# Patient Record
Sex: MALE | Race: WHITE | NOT HISPANIC OR LATINO | Employment: OTHER | URBAN - NONMETROPOLITAN AREA
[De-identification: names, ages, dates, MRNs, and addresses within clinical notes are randomized per-mention and may not be internally consistent; named-entity substitution may affect disease eponyms.]

---

## 2019-04-29 ENCOUNTER — TELEPHONE (OUTPATIENT)
Dept: FAMILY MEDICINE | Facility: OTHER | Age: 49
End: 2019-04-29

## 2019-04-29 ENCOUNTER — OFFICE VISIT (OUTPATIENT)
Dept: FAMILY MEDICINE | Facility: OTHER | Age: 49
End: 2019-04-29
Attending: NURSE PRACTITIONER

## 2019-04-29 VITALS
BODY MASS INDEX: 22.98 KG/M2 | TEMPERATURE: 97.7 F | HEART RATE: 76 BPM | RESPIRATION RATE: 15 BRPM | DIASTOLIC BLOOD PRESSURE: 84 MMHG | WEIGHT: 164.13 LBS | HEIGHT: 71 IN | SYSTOLIC BLOOD PRESSURE: 128 MMHG

## 2019-04-29 DIAGNOSIS — F19.90 IV DRUG USER: ICD-10-CM

## 2019-04-29 DIAGNOSIS — Z11.3 SCREEN FOR STD (SEXUALLY TRANSMITTED DISEASE): ICD-10-CM

## 2019-04-29 DIAGNOSIS — E55.9 VITAMIN D DEFICIENCY: ICD-10-CM

## 2019-04-29 DIAGNOSIS — Z48.02 VISIT FOR SUTURE REMOVAL: ICD-10-CM

## 2019-04-29 DIAGNOSIS — F15.10 METHAMPHETAMINE ABUSE (H): ICD-10-CM

## 2019-04-29 DIAGNOSIS — Z82.49 FH: HEART DISEASE: ICD-10-CM

## 2019-04-29 DIAGNOSIS — Z72.0 TOBACCO ABUSE: ICD-10-CM

## 2019-04-29 DIAGNOSIS — F41.9 ANXIETY: ICD-10-CM

## 2019-04-29 DIAGNOSIS — Z00.00 ROUTINE HISTORY AND PHYSICAL EXAMINATION OF ADULT: Primary | ICD-10-CM

## 2019-04-29 LAB
ALBUMIN SERPL-MCNC: 4.9 G/DL (ref 3.5–5.7)
ALP SERPL-CCNC: 56 U/L (ref 34–104)
ALT SERPL W P-5'-P-CCNC: 13 U/L (ref 7–52)
ANION GAP SERPL CALCULATED.3IONS-SCNC: 8 MMOL/L (ref 3–14)
AST SERPL W P-5'-P-CCNC: 12 U/L (ref 13–39)
BILIRUB SERPL-MCNC: 0.3 MG/DL (ref 0.3–1)
BUN SERPL-MCNC: 19 MG/DL (ref 7–25)
C TRACH DNA SPEC QL PROBE+SIG AMP: NOT DETECTED
CALCIUM SERPL-MCNC: 9.9 MG/DL (ref 8.6–10.3)
CHLORIDE SERPL-SCNC: 101 MMOL/L (ref 98–107)
CHOLEST SERPL-MCNC: 169 MG/DL
CO2 SERPL-SCNC: 31 MMOL/L (ref 21–31)
CREAT SERPL-MCNC: 0.86 MG/DL (ref 0.7–1.3)
DEPRECATED CALCIDIOL+CALCIFEROL SERPL-MC: 20.1 NG/ML
ERYTHROCYTE [DISTWIDTH] IN BLOOD BY AUTOMATED COUNT: 13.2 % (ref 10–15)
GFR SERPL CREATININE-BSD FRML MDRD: >90 ML/MIN/{1.73_M2}
GLUCOSE SERPL-MCNC: 89 MG/DL (ref 70–105)
HCT VFR BLD AUTO: 46.9 % (ref 40–53)
HDLC SERPL-MCNC: 42 MG/DL (ref 23–92)
HGB BLD-MCNC: 15.5 G/DL (ref 13.3–17.7)
LDLC SERPL CALC-MCNC: 93 MG/DL
MCH RBC QN AUTO: 32.1 PG (ref 26.5–33)
MCHC RBC AUTO-ENTMCNC: 33 G/DL (ref 31.5–36.5)
MCV RBC AUTO: 97 FL (ref 78–100)
N GONORRHOEA DNA SPEC QL PROBE+SIG AMP: NOT DETECTED
NONHDLC SERPL-MCNC: 127 MG/DL
PLATELET # BLD AUTO: 412 10E9/L (ref 150–450)
POTASSIUM SERPL-SCNC: 4 MMOL/L (ref 3.5–5.1)
PROT SERPL-MCNC: 7.8 G/DL (ref 6.4–8.9)
RBC # BLD AUTO: 4.83 10E12/L (ref 4.4–5.9)
SODIUM SERPL-SCNC: 140 MMOL/L (ref 134–144)
SPECIMEN SOURCE: NORMAL
TRIGL SERPL-MCNC: 170 MG/DL
TSH SERPL DL<=0.05 MIU/L-ACNC: 1.64 IU/ML (ref 0.34–5.6)
WBC # BLD AUTO: 6.4 10E9/L (ref 4–11)

## 2019-04-29 PROCEDURE — 87491 CHLMYD TRACH DNA AMP PROBE: CPT | Mod: ZL | Performed by: NURSE PRACTITIONER

## 2019-04-29 PROCEDURE — 82306 VITAMIN D 25 HYDROXY: CPT | Mod: ZL | Performed by: NURSE PRACTITIONER

## 2019-04-29 PROCEDURE — 87389 HIV-1 AG W/HIV-1&-2 AB AG IA: CPT | Mod: ZL | Performed by: NURSE PRACTITIONER

## 2019-04-29 PROCEDURE — 80053 COMPREHEN METABOLIC PANEL: CPT | Mod: ZL | Performed by: NURSE PRACTITIONER

## 2019-04-29 PROCEDURE — 85027 COMPLETE CBC AUTOMATED: CPT | Mod: ZL | Performed by: NURSE PRACTITIONER

## 2019-04-29 PROCEDURE — 86803 HEPATITIS C AB TEST: CPT | Mod: ZL | Performed by: NURSE PRACTITIONER

## 2019-04-29 PROCEDURE — 99386 PREV VISIT NEW AGE 40-64: CPT | Performed by: NURSE PRACTITIONER

## 2019-04-29 PROCEDURE — 87591 N.GONORRHOEAE DNA AMP PROB: CPT | Mod: ZL | Performed by: NURSE PRACTITIONER

## 2019-04-29 PROCEDURE — 36415 COLL VENOUS BLD VENIPUNCTURE: CPT | Mod: ZL | Performed by: NURSE PRACTITIONER

## 2019-04-29 PROCEDURE — 84443 ASSAY THYROID STIM HORMONE: CPT | Mod: ZL | Performed by: NURSE PRACTITIONER

## 2019-04-29 PROCEDURE — 80061 LIPID PANEL: CPT | Mod: ZL | Performed by: NURSE PRACTITIONER

## 2019-04-29 ASSESSMENT — MIFFLIN-ST. JEOR: SCORE: 1636.6

## 2019-04-29 ASSESSMENT — PAIN SCALES - GENERAL: PAINLEVEL: SEVERE PAIN (7)

## 2019-04-29 NOTE — TELEPHONE ENCOUNTER
Precious from [facility] has questions regarding Dayron's visit today. Please return call. Thank you!

## 2019-04-29 NOTE — NURSING NOTE
Patient presents to clinic today for a physical for [facility]. Patient states he is also needing sutures removed from left buttock. Sutures were placed 10 days ago by at doctor is Joey. Patient is currently taking cephalexin, however he would like to discuss discontinuing medication.     No LMP for male patient.  Medication Reconciliation: complete    Landy Robert LPN  4/29/2019 8:32 AM
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
## 2019-04-29 NOTE — TELEPHONE ENCOUNTER
Spoke with Precious and she states paperwork states he can stop antibiotics, however PATRICIA says to finish. Clarified with Lisa MIN and he can stop antibiotic.  Landy Robert LPN...................4/29/2019  10:58 AM

## 2019-04-29 NOTE — PATIENT INSTRUCTIONS
Suture removal: finish the rest of your antibiotics to reduce risk of skin infection. Yogurt and/or a probiotic supplement helps with antibiotic-associated stomach issues. Keep wound clean, you may use Vaseline or triple-antibiotic ointment (Neosporin) with a band-aid over the wound to aid in healing.     Consider quitting smoking cigarettes as well. [place] Quit Plan hotline can provide you with free patches.     We will contact you with the results of your lab work today.     Visit a dentist and eye doctor when you are able.     Follow-up as needed.

## 2019-04-30 LAB
HCV AB SERPL QL IA: NONREACTIVE
HIV 1+2 AB+HIV1 P24 AG SERPL QL IA: NONREACTIVE

## 2019-10-09 ENCOUNTER — OFFICE VISIT (OUTPATIENT)
Dept: FAMILY MEDICINE | Facility: OTHER | Age: 49
End: 2019-10-09
Attending: NURSE PRACTITIONER
Payer: MEDICARE

## 2019-10-09 VITALS
SYSTOLIC BLOOD PRESSURE: 138 MMHG | DIASTOLIC BLOOD PRESSURE: 80 MMHG | OXYGEN SATURATION: 98 % | HEART RATE: 63 BPM | BODY MASS INDEX: 19.94 KG/M2 | TEMPERATURE: 97.7 F | WEIGHT: 143 LBS

## 2019-10-09 DIAGNOSIS — R63.4 WEIGHT LOSS: Primary | ICD-10-CM

## 2019-10-09 DIAGNOSIS — R53.83 FATIGUE, UNSPECIFIED TYPE: ICD-10-CM

## 2019-10-09 LAB
ALBUMIN SERPL-MCNC: 4.2 G/DL (ref 3.4–5)
ALP SERPL-CCNC: 54 U/L (ref 40–150)
ALT SERPL W P-5'-P-CCNC: 16 U/L (ref 0–70)
ANION GAP SERPL CALCULATED.3IONS-SCNC: 6 MMOL/L (ref 3–14)
AST SERPL W P-5'-P-CCNC: 6 U/L (ref 0–45)
BASOPHILS # BLD AUTO: 0.1 10E9/L (ref 0–0.2)
BASOPHILS NFR BLD AUTO: 0.5 %
BILIRUB SERPL-MCNC: 0.2 MG/DL (ref 0.2–1.3)
BUN SERPL-MCNC: 13 MG/DL (ref 7–30)
CALCIUM SERPL-MCNC: 8.7 MG/DL (ref 8.5–10.1)
CHLORIDE SERPL-SCNC: 108 MMOL/L (ref 94–109)
CO2 SERPL-SCNC: 29 MMOL/L (ref 20–32)
CREAT SERPL-MCNC: 0.84 MG/DL (ref 0.66–1.25)
CRP SERPL-MCNC: <2.9 MG/L (ref 0–8)
DIFFERENTIAL METHOD BLD: ABNORMAL
EOSINOPHIL # BLD AUTO: 0.3 10E9/L (ref 0–0.7)
EOSINOPHIL NFR BLD AUTO: 2.4 %
ERYTHROCYTE [DISTWIDTH] IN BLOOD BY AUTOMATED COUNT: 13.2 % (ref 10–15)
ERYTHROCYTE [SEDIMENTATION RATE] IN BLOOD BY WESTERGREN METHOD: 5 MM/H (ref 0–15)
GFR SERPL CREATININE-BSD FRML MDRD: >90 ML/MIN/{1.73_M2}
GLUCOSE SERPL-MCNC: 98 MG/DL (ref 70–99)
HCT VFR BLD AUTO: 45.9 % (ref 40–53)
HGB BLD-MCNC: 15.5 G/DL (ref 13.3–17.7)
IMM GRANULOCYTES # BLD: 0 10E9/L (ref 0–0.4)
IMM GRANULOCYTES NFR BLD: 0.4 %
LYMPHOCYTES # BLD AUTO: 2.7 10E9/L (ref 0.8–5.3)
LYMPHOCYTES NFR BLD AUTO: 23.4 %
MCH RBC QN AUTO: 32.1 PG (ref 26.5–33)
MCHC RBC AUTO-ENTMCNC: 33.8 G/DL (ref 31.5–36.5)
MCV RBC AUTO: 95 FL (ref 78–100)
MONOCYTES # BLD AUTO: 0.5 10E9/L (ref 0–1.3)
MONOCYTES NFR BLD AUTO: 4.6 %
NEUTROPHILS # BLD AUTO: 7.8 10E9/L (ref 1.6–8.3)
NEUTROPHILS NFR BLD AUTO: 68.7 %
NRBC # BLD AUTO: 0 10*3/UL
NRBC BLD AUTO-RTO: 0 /100
PLATELET # BLD AUTO: 366 10E9/L (ref 150–450)
POTASSIUM SERPL-SCNC: 3.9 MMOL/L (ref 3.4–5.3)
PROT SERPL-MCNC: 7.6 G/DL (ref 6.8–8.8)
RBC # BLD AUTO: 4.83 10E12/L (ref 4.4–5.9)
SODIUM SERPL-SCNC: 143 MMOL/L (ref 133–144)
TSH SERPL DL<=0.005 MIU/L-ACNC: 2.66 MU/L (ref 0.4–4)
WBC # BLD AUTO: 11.4 10E9/L (ref 4–11)

## 2019-10-09 PROCEDURE — 85025 COMPLETE CBC W/AUTO DIFF WBC: CPT | Mod: ZL | Performed by: NURSE PRACTITIONER

## 2019-10-09 PROCEDURE — G0463 HOSPITAL OUTPT CLINIC VISIT: HCPCS

## 2019-10-09 PROCEDURE — 80053 COMPREHEN METABOLIC PANEL: CPT | Mod: ZL | Performed by: NURSE PRACTITIONER

## 2019-10-09 PROCEDURE — 87015 SPECIMEN INFECT AGNT CONCNTJ: CPT | Mod: ZL | Performed by: NURSE PRACTITIONER

## 2019-10-09 PROCEDURE — 85652 RBC SED RATE AUTOMATED: CPT | Mod: ZL | Performed by: NURSE PRACTITIONER

## 2019-10-09 PROCEDURE — 99203 OFFICE O/P NEW LOW 30 MIN: CPT | Performed by: NURSE PRACTITIONER

## 2019-10-09 PROCEDURE — 87207 SMEAR SPECIAL STAIN: CPT | Mod: ZL | Performed by: NURSE PRACTITIONER

## 2019-10-09 PROCEDURE — 36415 COLL VENOUS BLD VENIPUNCTURE: CPT | Mod: ZL | Performed by: NURSE PRACTITIONER

## 2019-10-09 PROCEDURE — 86618 LYME DISEASE ANTIBODY: CPT | Mod: ZL | Performed by: NURSE PRACTITIONER

## 2019-10-09 PROCEDURE — 84443 ASSAY THYROID STIM HORMONE: CPT | Mod: ZL | Performed by: NURSE PRACTITIONER

## 2019-10-09 PROCEDURE — 86140 C-REACTIVE PROTEIN: CPT | Mod: ZL | Performed by: NURSE PRACTITIONER

## 2019-10-09 RX ORDER — BUPRENORPHINE AND NALOXONE 8; 2 MG/1; MG/1
1 FILM, SOLUBLE BUCCAL; SUBLINGUAL 2 TIMES DAILY
COMMUNITY
End: 2021-12-15

## 2019-10-09 ASSESSMENT — PAIN SCALES - GENERAL: PAINLEVEL: NO PAIN (0)

## 2019-10-09 NOTE — NURSING NOTE
Dr. Colby Crocker    10/26/2017      Jack O Fabry  [address] [id number]                    Dear Mr. Fabry    Please review the enclosed information.    Thank you.   "Chief Complaint   Patient presents with     Establish Care       Initial BP (!) 150/90 (BP Location: Left arm, Patient Position: Chair, Cuff Size: Adult Regular)   Pulse 63   Temp 97.7  F (36.5  C) (Tympanic)   Wt 64.9 kg (143 lb)   SpO2 98%   BMI 19.94 kg/m   Estimated body mass index is 19.94 kg/m  as calculated from the following:    Height as of 4/29/19: 1.803 m (5' 11\").    Weight as of this encounter: 64.9 kg (143 lb).  Medication Reconciliation: complete  Alden Rodriguez LPN  "

## 2019-10-09 NOTE — PROGRESS NOTES
Subjective     Dayron Rick is a 48 year old male who presents to clinic today for the following health issues:    HPI   New Patient/Transfer of Care  Living in [place] now    Weight loss and not feeling  well      Duration: ongoing 1 month     Description (location/character/radiation): whole body, no energy,     Intensity:  severe    Accompanying signs and symptoms: no energy, 20lb weight loss, fingers tingle when arms are bent, lump on right wrist, lump on upper right quadrant with bruising around the area, nauseated when waking up, night sweats/skin feels on fire, itching, blurry vision, constipation.     History (similar episodes/previous evaluation): None    Precipitating or alleviating factors: None    Therapies tried and outcome: None, tries to stay active, but is having difficulty getting out of bed    Family history of heart disease, diabetes and stroke     Nausea/vomiting: nauseated, but not vomiting   Bowel changes: slightly constipated   Fever: wakes up feeling hot, having night sweats occasionally   Fatigue: extreme fatigue past month possible longer  Alcohol/drug use:very little alcohol use, a limited use of mariguana   Diet: fruits, vegetables, pork, hamburger, beans  Water, adds flavor  Caffeine: none  Smoke: 1/2 ppd or less           Suboxone: prescribed through [facility]   He follows with [facility] for counseling ad psychiatry and chemical dependency     History of Chronic back pain and fusion. Treated at [facility]. Has not seen a provider at [facility] since 2012.  He cannot use pain medication due to additions     There is no problem list on file for this patient.    Past Surgical History:   Procedure Laterality Date     FUSION LUMBAR ANTERIOR, FUSION LUMBAR POSTERIOR THREE+ LEVELS, COMBINED  2013     TONSILLECTOMY         Social History     Tobacco Use     Smoking status: Current Every Day Smoker     Packs/day: 0.50     Smokeless tobacco:  Never Used   Substance Use Topics     Alcohol use: Not Currently     Comment: HX      Family History   Problem Relation Age of Onset     Diabetes Mother          at 58, unsure of cause of death     Diabetes Type 2  Father      Heart Disease Father 78        quadruple bypass     Cardiovascular Father 79        stroke     Prostate Problems Father      Cancer Paternal Grandfather         stomach cancer         Current Outpatient Medications   Medication Sig Dispense Refill     buprenorphine HCl-naloxone HCl (SUBOXONE) 8-2 MG per film Place 1 Film under the tongue 2 times daily       No Known Allergies  BP Readings from Last 3 Encounters:   10/09/19 138/80   19 128/84    Wt Readings from Last 3 Encounters:   10/09/19 64.9 kg (143 lb)   19 74.4 kg (164 lb 2 oz)                      Reviewed and updated as needed this visit by Provider         Review of Systems   ROS COMP: CONSTITUTIONAL:fever low grade and sweats  INTEGUMENTARY/SKIN: NEGATIVE for worrisome rashes, moles or lesions and occasional itchy skin POSITIVE lump right abdomen  EYES: poor eye sight, due for eye exam   ENT/MOUTH: NEGATIVE for ear, mouth and throat problems  RESP:NEGATIVE for significant cough or SOB  CV: NEGATIVE for chest pain, or peripheral edema has had palpitations in the past   GI: nausea, poor appetite and weight loss  : negative for dysuria, hematuria, decreased urinary stream, erectile dysfunction  MUSCULOSKELETAL: NEGATIVE for significant arthralgias or myalgia  History of back fusion   NEURO: generalized weakness   ENDOCRINE: night sweats and weight loss  HEME/ALLERGY/IMMUNE: NEGATIVE for bleeding problems  PSYCHIATRIC: HX anxiety and HX depression      Objective    BP (!) 150/90 (BP Location: Left arm, Patient Position: Chair, Cuff Size: Adult Regular)   Pulse 63   Temp 97.7  F (36.5  C) (Tympanic)   Wt 64.9 kg (143 lb)   SpO2 98%   BMI 19.94 kg/m    Body mass index is 19.94 kg/m .  Physical Exam   GENERAL:  alert, no distress, pale and fatigued  EYES: Eyes grossly normal to inspection, PERRL and conjunctivae and sclerae normal  HENT: ear canals and TM's normal, nose and mouth without ulcers or lesions  NECK: no adenopathy, no asymmetry, masses, or scars and thyroid normal to palpation  RESP: lungs clear to auscultation - no rales, rhonchi or wheezes  CV: regular rate and rhythm, normal S1 S2, no S3 or S4, no murmur, click or rub, no peripheral edema and peripheral pulses strong  ABDOMEN: soft, nontender, no hepatosplenomegaly, no masses and bowel sounds normal  MS: no gross musculoskeletal defects noted, no edema  SKIN: no suspicious lesions or rashes  NEURO: Normal strength and tone, mentation intact and speech normal  PSYCH: mentation appears normal and affect flat  LYMPH: normal ant/post cervical, supraclavicular nodes    Diagnostic Test Results:  Labs reviewed in Epic  Results for orders placed or performed in visit on 10/09/19 (from the past 24 hour(s))   Comprehensive metabolic panel (BMP + Alb, Alk Phos, ALT, AST, Total. Bili, TP)   Result Value Ref Range    Sodium 143 133 - 144 mmol/L    Potassium 3.9 3.4 - 5.3 mmol/L    Chloride 108 94 - 109 mmol/L    Carbon Dioxide 29 20 - 32 mmol/L    Anion Gap 6 3 - 14 mmol/L    Glucose 98 70 - 99 mg/dL    Urea Nitrogen 13 7 - 30 mg/dL    Creatinine 0.84 0.66 - 1.25 mg/dL    GFR Estimate >90 >60 mL/min/[1.73_m2]    GFR Estimate If Black >90 >60 mL/min/[1.73_m2]    Calcium 8.7 8.5 - 10.1 mg/dL    Bilirubin Total PENDING 0.2 - 1.3 mg/dL    Albumin PENDING 3.4 - 5.0 g/dL    Protein Total PENDING 6.8 - 8.8 g/dL    Alkaline Phosphatase PENDING 40 - 150 U/L    ALT PENDING 0 - 70 U/L    AST PENDING 0 - 45 U/L   CBC with platelets and differential   Result Value Ref Range    WBC 11.4 (H) 4.0 - 11.0 10e9/L    RBC Count 4.83 4.4 - 5.9 10e12/L    Hemoglobin 15.5 13.3 - 17.7 g/dL    Hematocrit 45.9 40.0 - 53.0 %    MCV 95 78 - 100 fl    MCH 32.1 26.5 - 33.0 pg    MCHC 33.8 31.5 - 36.5  g/dL    RDW 13.2 10.0 - 15.0 %    Platelet Count 366 150 - 450 10e9/L    Diff Method Automated Method     % Neutrophils 68.7 %    % Lymphocytes 23.4 %    % Monocytes 4.6 %    % Eosinophils 2.4 %    % Basophils 0.5 %    % Immature Granulocytes 0.4 %    Nucleated RBCs 0 0 /100    Absolute Neutrophil 7.8 1.6 - 8.3 10e9/L    Absolute Lymphocytes 2.7 0.8 - 5.3 10e9/L    Absolute Monocytes 0.5 0.0 - 1.3 10e9/L    Absolute Eosinophils 0.3 0.0 - 0.7 10e9/L    Absolute Basophils 0.1 0.0 - 0.2 10e9/L    Abs Immature Granulocytes 0.0 0 - 0.4 10e9/L    Absolute Nucleated RBC 0.0    CRP, inflammation   Result Value Ref Range    CRP Inflammation <2.9 0.0 - 8.0 mg/L   ESR: Erythrocyte sedimentation rate   Result Value Ref Range    Sed Rate 5 0 - 15 mm/h     Additional labs pending         Assessment & Plan     1. Weight loss  20 pound unintentional weight loss over the past month or possible slightly longer. He is on suboxone for the 3-4 months and has had good result with his cravings and chronic pain. Unknown if causing his symptoms. Plan to check labs today. He is not able to stay around for results tonight. Plan to notify of results once available. Possible may need imagine of chest and abdomen. Plan for follow up in a couple weeks.he should schedule an appointment sooner if symptoms worsen or any concerns. He should go to the ER for any worsening symptoms or concerns.   - Comprehensive metabolic panel (BMP + Alb, Alk Phos, ALT, AST, Total. Bili, TP)  - TSH with free T4 reflex  - CBC with platelets and differential  - Lyme Disease Annemarie with reflex to WB Serum  - CRP, inflammation  - ESR: Erythrocyte sedimentation rate  - Parasite stain    2. Fatigue, unspecified type  As above  - Comprehensive metabolic panel (BMP + Alb, Alk Phos, ALT, AST, Total. Bili, TP)  - TSH with free T4 reflex  - CBC with platelets and differential  - Lyme Disease Annemarie with reflex to WB Serum  - CRP, inflammation  - ESR: Erythrocyte sedimentation rate  -  Parasite stain     Tobacco Cessation:   reports that he has been smoking. He has been smoking about 0.50 packs per day. He has never used smokeless tobacco.  Tobacco cessation was strongly encouraged. Every year of smoking cessation offer health benefits.               Return in about 2 weeks (around 10/23/2019).    NELLIE Cooney CNP  [facility] [place]

## 2019-10-11 LAB
B BURGDOR IGG+IGM SER QL: 0.06 (ref 0–0.89)
PARASITE SPEC INSPECT: NORMAL
SPECIMEN SOURCE: NORMAL

## 2019-10-23 NOTE — PROGRESS NOTES
Subjective     Dayron Rick is a 49 year old male who presents to clinic today for the following health issues:    HPI   Weight loss      Duration: Follow up    Description (location/character/radiation): loss of energy - no complaints of weight loss today.    Intensity:  No concerns for weight loss. His concerns are for lack of energy     Accompanying signs and symptoms: Still has no energy/fatigue    History (similar episodes/previous evaluation): Yes - Seen in clinic 10/9/19    Precipitating or alleviating factors: None    Therapies tried and outcome: None     Patient weighs 158lb, our scale in clinic is off (per patient). Patient states weight loss is not a problem - the fatigue/no energy is the problem. He believes the initial concern for weight loss was because our scale was off by 12lbs, he was weighed the day after the last appointment on a digital scale and it said 158lb.     Counseling at [facility]. Along with medication management. He is on suboxone. [facility] does not feel the suboxone is causing his fatigue.  Treatment only for chemical dependency, not for mental health     Reviewed labs from last visit.         There is no problem list on file for this patient.    Past Surgical History:   Procedure Laterality Date     FUSION LUMBAR ANTERIOR, FUSION LUMBAR POSTERIOR THREE+ LEVELS, COMBINED  2013     TONSILLECTOMY         Social History     Tobacco Use     Smoking status: Current Every Day Smoker     Packs/day: 0.50     Smokeless tobacco: Never Used   Substance Use Topics     Alcohol use: Not Currently     Comment: HX      Family History   Problem Relation Age of Onset     Diabetes Mother          at 58, unsure of cause of death     Diabetes Type 2  Father      Heart Disease Father 78        quadruple bypass     Cardiovascular Father 79        stroke     Prostate Problems Father      Cancer Paternal Grandfather         stomach cancer         Current  "Outpatient Medications   Medication Sig Dispense Refill     buprenorphine HCl-naloxone HCl (SUBOXONE) 8-2 MG per film Place 1 Film under the tongue 2 times daily       Allergies   Allergen Reactions     Adhesive Tape      Steri strips caused blisters     BP Readings from Last 3 Encounters:   10/28/19 128/82   10/09/19 138/80   04/29/19 128/84    Wt Readings from Last 3 Encounters:   10/28/19 71.7 kg (158 lb)   10/09/19 64.9 kg (143 lb)   04/29/19 74.4 kg (164 lb 2 oz)                  Reviewed and updated as needed this visit by Provider         Review of Systems   ROS COMP: CONSTITUTIONAL:NEGATIVE for fever, chills, change in weight  INTEGUMENTARY/SKIN: NEGATIVE for worrisome rashes, moles or lesions  ENT/MOUTH: NEGATIVE for ear, mouth and throat problems  RESP:cold this week smokes 1/2 PPD  CV: NEGATIVE for chest pain or peripheral edema POSSIBLE occasional palpitation   GI: constipation and nausea occasionally   : negative for dysuria, hematuria, decreased urinary stream, erectile dysfunction  MUSCULOSKELETAL: NEGATIVE for significant arthralgias or myalgia  NEURO: chronic fatigue has had for a few months   ENDOCRINE: NEGATIVE for temperature intolerance, skin/hair changes  PSYCHIATRIC: HX anxiety and HX depression      Objective    /82   Pulse 74   Temp 97.7  F (36.5  C) (Tympanic)   Resp 16   Ht 1.803 m (5' 11\")   Wt 71.7 kg (158 lb)   SpO2 98%   BMI 22.04 kg/m    Body mass index is 22.04 kg/m .  Physical Exam   GENERAL: alert and no distress  NECK: no adenopathy, no asymmetry, masses, or scars and thyroid normal to palpation  RESP: lungs clear to auscultation - no rales, rhonchi or wheezes  CV: regular rate and rhythm, normal S1 S2, no S3 or S4, no murmur, click or rub, no peripheral edema and peripheral pulses strong  ABDOMEN: soft, nontender, no hepatosplenomegaly, no masses and bowel sounds normal  SKIN: no suspicious lesions or rashes  PSYCH: mentation appears normal and affect " flat    Diagnostic Test Results:  Labs reviewed in Epic  Results for orders placed or performed in visit on 10/09/19   Comprehensive metabolic panel (BMP + Alb, Alk Phos, ALT, AST, Total. Bili, TP)   Result Value Ref Range    Sodium 143 133 - 144 mmol/L    Potassium 3.9 3.4 - 5.3 mmol/L    Chloride 108 94 - 109 mmol/L    Carbon Dioxide 29 20 - 32 mmol/L    Anion Gap 6 3 - 14 mmol/L    Glucose 98 70 - 99 mg/dL    Urea Nitrogen 13 7 - 30 mg/dL    Creatinine 0.84 0.66 - 1.25 mg/dL    GFR Estimate >90 >60 mL/min/[1.73_m2]    GFR Estimate If Black >90 >60 mL/min/[1.73_m2]    Calcium 8.7 8.5 - 10.1 mg/dL    Bilirubin Total 0.2 0.2 - 1.3 mg/dL    Albumin 4.2 3.4 - 5.0 g/dL    Protein Total 7.6 6.8 - 8.8 g/dL    Alkaline Phosphatase 54 40 - 150 U/L    ALT 16 0 - 70 U/L    AST 6 0 - 45 U/L   TSH with free T4 reflex   Result Value Ref Range    TSH 2.66 0.40 - 4.00 mU/L   CBC with platelets and differential   Result Value Ref Range    WBC 11.4 (H) 4.0 - 11.0 10e9/L    RBC Count 4.83 4.4 - 5.9 10e12/L    Hemoglobin 15.5 13.3 - 17.7 g/dL    Hematocrit 45.9 40.0 - 53.0 %    MCV 95 78 - 100 fl    MCH 32.1 26.5 - 33.0 pg    MCHC 33.8 31.5 - 36.5 g/dL    RDW 13.2 10.0 - 15.0 %    Platelet Count 366 150 - 450 10e9/L    Diff Method Automated Method     % Neutrophils 68.7 %    % Lymphocytes 23.4 %    % Monocytes 4.6 %    % Eosinophils 2.4 %    % Basophils 0.5 %    % Immature Granulocytes 0.4 %    Nucleated RBCs 0 0 /100    Absolute Neutrophil 7.8 1.6 - 8.3 10e9/L    Absolute Lymphocytes 2.7 0.8 - 5.3 10e9/L    Absolute Monocytes 0.5 0.0 - 1.3 10e9/L    Absolute Eosinophils 0.3 0.0 - 0.7 10e9/L    Absolute Basophils 0.1 0.0 - 0.2 10e9/L    Abs Immature Granulocytes 0.0 0 - 0.4 10e9/L    Absolute Nucleated RBC 0.0    Lyme Disease Annemarie with reflex to WB Serum   Result Value Ref Range    Lyme Disease Antibodies Serum 0.06 0.00 - 0.89   CRP, inflammation   Result Value Ref Range    CRP Inflammation <2.9 0.0 - 8.0 mg/L   ESR: Erythrocyte  sedimentation rate   Result Value Ref Range    Sed Rate 5 0 - 15 mm/h   Parasite stain   Result Value Ref Range    Specimen Description Blood     Parasite Stain No Babesia found     Parasite Stain       No Anaplasma phagocytophilum or Ehrlichia spp. found    Parasite Stain       Giemsa stained thin and thick smears reveal no inclusions of Anaplasma phagocytophilum or   Ehrlichia spp. and no Babesia species.      Parasite Stain       Haylee Chen M.D., Medical Director  10/11/19             Assessment & Plan     1. Depression, unspecified depression type  Salbador state he is not concerned for weight loss. He has fatigue, stress and no energy. Reviewed labs done at last visit. He is encouraged to try OTC vitamin D. Plan to start an antidepressant. Plan to start low and go slow with his treatment of suboxone.  He is to notify his provider at [facility]. Discussed medication and side effects. He should call if any concerning symptoms or stop medication if his symptoms worsen.   - sertraline (ZOLOFT) 50 MG tablet; Take 1 tablet (50 mg) by mouth daily  Dispense: 30 tablet; Refill: 1     Tobacco Cessation:   reports that he has been smoking. He has been smoking about 0.50 packs per day. He has never used smokeless tobacco.  Tobacco cessation was strongly encouraged. Every year of smoking cessation offer health benefits.           See Patient Instructions    Return in about 4 weeks (around 11/25/2019) for depression.    NELLIE [facility] - DIANE

## 2019-10-28 ENCOUNTER — OFFICE VISIT (OUTPATIENT)
Dept: FAMILY MEDICINE | Facility: OTHER | Age: 49
End: 2019-10-28
Attending: NURSE PRACTITIONER
Payer: MEDICARE

## 2019-10-28 VITALS
TEMPERATURE: 97.7 F | WEIGHT: 158 LBS | OXYGEN SATURATION: 98 % | BODY MASS INDEX: 22.12 KG/M2 | HEIGHT: 71 IN | SYSTOLIC BLOOD PRESSURE: 128 MMHG | RESPIRATION RATE: 16 BRPM | DIASTOLIC BLOOD PRESSURE: 82 MMHG | HEART RATE: 74 BPM

## 2019-10-28 DIAGNOSIS — F32.A DEPRESSION, UNSPECIFIED DEPRESSION TYPE: Primary | ICD-10-CM

## 2019-10-28 PROCEDURE — 99213 OFFICE O/P EST LOW 20 MIN: CPT | Performed by: NURSE PRACTITIONER

## 2019-10-28 PROCEDURE — G0463 HOSPITAL OUTPT CLINIC VISIT: HCPCS

## 2019-10-28 ASSESSMENT — PAIN SCALES - GENERAL: PAINLEVEL: MODERATE PAIN (5)

## 2019-10-28 ASSESSMENT — MIFFLIN-ST. JEOR: SCORE: 1603.81

## 2019-10-28 NOTE — PATIENT INSTRUCTIONS
Patient Education     Sertraline tablets  Brand Name: Zoloft  What is this medicine?  SERTRALINE (SER tra mesha) is used to treat depression. It may also be used to treat obsessive compulsive disorder, panic disorder, post-trauma stress, premenstrual dysphoric disorder (PMDD) or social anxiety.  How should I use this medicine?  Take this medicine by mouth with a glass of water. Follow the directions on the prescription label. You can take it with or without food. Take your medicine at regular intervals. Do not take your medicine more often than directed. Do not stop taking this medicine suddenly except upon the advice of your doctor. Stopping this medicine too quickly may cause serious side effects or your condition may worsen.  A special MedGuide will be given to you by the pharmacist with each prescription and refill. Be sure to read this information carefully each time.  Talk to your pediatrician regarding the use of this medicine in children. While this drug may be prescribed for children as young as 7 years for selected conditions, precautions do apply.  What side effects may I notice from receiving this medicine?  Side effects that you should report to your doctor or health care professional as soon as possible:    allergic reactions like skin rash, itching or hives, swelling of the face, lips, or tongue    anxious    black, tarry stools    changes in vision    confusion    elevated mood, decreased need for sleep, racing thoughts, impulsive behavior    eye pain    fast, irregular heartbeat    feeling faint or lightheaded, falls    feeling agitated, angry, or irritable    hallucination, loss of contact with reality    loss of balance or coordination    loss of memory    painful or prolonged erections    restlessness, pacing, inability to keep still    seizures    stiff muscles    suicidal thoughts or other mood changes    trouble sleeping    unusual bleeding or bruising    unusually weak or  tired    vomiting  Side effects that usually do not require medical attention (report to your doctor or health care professional if they continue or are bothersome):    change in appetite or weight    change in sex drive or performance    diarrhea    increased sweating    indigestion, nausea    tremors  What may interact with this medicine?  Do not take this medicine with any of the following medications:    cisapride    dofetilide    dronedarone    linezolid    MAOIs like Carbex, Eldepryl, Marplan, Nardil, and Parnate    methylene blue (injected into a vein)    pimozide    thioridazine  This medicine may also interact with the following medications:    alcohol    amphetamines    aspirin and aspirin-like medicines    certain medicines for depression, anxiety, or psychotic disturbances    certain medicines for fungal infections like ketoconazole, fluconazole, posaconazole, and itraconazole    certain medicines for irregular heart beat like flecainide, quinidine, propafenone    certain medicines for migraine headaches like almotriptan, eletriptan, frovatriptan, naratriptan, rizatriptan, sumatriptan, zolmitriptan    certain medicines for sleep    certain medicines for seizures like carbamazepine, valproic acid, phenytoin    certain medicines that treat or prevent blood clots like warfarin, enoxaparin, dalteparin    cimetidine    digoxin    diuretics    fentanyl    isoniazid    lithium    NSAIDs, medicines for pain and inflammation, like ibuprofen or naproxen    other medicines that prolong the QT interval (cause an abnormal heart rhythm)    rasagiline    safinamide    supplements like [place]'s wort, kava kava, valerian    tolbutamide    tramadol    tryptophan  What if I miss a dose?  If you miss a dose, take it as soon as you can. If it is almost time for your next dose, take only that dose. Do not take double or extra doses.  Where should I keep my medicine?  Keep out of the reach of children.  Store at room  temperature between 15 and 30 degrees C (59 and 86 degrees F). Throw away any unused medicine after the expiration date.  What should I tell my health care provider before I take this medicine?  They need to know if you have any of these conditions:    bleeding disorders    bipolar disorder or a family history of bipolar disorder    glaucoma    heart disease    high blood pressure    history of irregular heartbeat    history of low levels of calcium, magnesium, or potassium in the blood    if you often drink alcohol    liver disease    receiving electroconvulsive therapy    seizures    suicidal thoughts, plans, or attempt; a previous suicide attempt by you or a family member    take medicines that treat or prevent blood clots    thyroid disease    an unusual or allergic reaction to sertraline, other medicines, foods, dyes, or preservatives    pregnant or trying to get pregnant    breast-feeding  What should I watch for while using this medicine?  Tell your doctor if your symptoms do not get better or if they get worse. Visit your doctor or health care professional for regular checks on your progress. Because it may take several weeks to see the full effects of this medicine, it is important to continue your treatment as prescribed by your doctor.  Patients and their families should watch out for new or worsening thoughts of suicide or depression. Also watch out for sudden changes in feelings such as feeling anxious, agitated, panicky, irritable, hostile, aggressive, impulsive, severely restless, overly excited and hyperactive, or not being able to sleep. If this happens, especially at the beginning of treatment or after a change in dose, call your health care professional.  You may get drowsy or dizzy. Do not drive, use machinery, or do anything that needs mental alertness until you know how this medicine affects you. Do not stand or sit up quickly, especially if you are an older patient. This reduces the risk of  dizzy or fainting spells. Alcohol may interfere with the effect of this medicine. Avoid alcoholic drinks.  Your mouth may get dry. Chewing sugarless gum or sucking hard candy, and drinking plenty of water may help. Contact your doctor if the problem does not go away or is severe.  NOTE:This sheet is a summary. It may not cover all possible information. If you have questions about this medicine, talk to your doctor, pharmacist, or health care provider. Copyright  2019 [employer]

## 2019-10-28 NOTE — NURSING NOTE
"Chief Complaint   Patient presents with     Weight Loss     follow up       Initial /82   Pulse 74   Temp 97.7  F (36.5  C) (Tympanic)   Resp 16   Ht 1.803 m (5' 11\")   Wt 71.7 kg (158 lb)   SpO2 98%   BMI 22.04 kg/m   Estimated body mass index is 22.04 kg/m  as calculated from the following:    Height as of this encounter: 1.803 m (5' 11\").    Weight as of this encounter: 71.7 kg (158 lb).  Medication Reconciliation: complete  Fatou Delgado LPN  "

## 2020-03-11 ENCOUNTER — HEALTH MAINTENANCE LETTER (OUTPATIENT)
Age: 50
End: 2020-03-11

## 2020-05-06 NOTE — PATIENT INSTRUCTIONS
Physical Therapy Daily Treatment Note     Name: Jose C Markham  [facility] Number: [id number]    Therapy Diagnosis:   Encounter Diagnosis   Name Primary?    Decreased activities of daily living (ADL)      Physician: Rena Simon, *    Visit Date: 5/6/2020  Physician Orders: PT Eval and Treat   Medical Diagnosis from Referral: S83.005D (ICD-10-CM) - Closed dislocation of left patella, subsequent encounter        Left knee medial patellofemoral ligament reconstruction with allograft  Evaluation Date: 2/27/2020  Authorization Period Expiration: 05/29/2020  Plan of Care Expiration: 05/29/2020  Visit # / Visits authorized: 14 / 30                      Time In: 1455  Time Out: 1555  Total Billable Time: 53 Minutes    Precautions:  He can work with PT to wean out of his brace at this point                       Surgery (02/13/2020)          S/P Begin week 9     PHASE I (2-6 weeks)  GOALS:  Tendon healing   Pain and swelling control   ROM 0-90 by the end of 6 weeks.     EXERCISES/RESTRICTIONS:   Continue with brace, crutches  50% weight bearing in extension   keep locked in extension even when sleeping except for ROM exercises within limits of brace  Avoid active knee extension   Avoid aggressive flexion   Physical Therapy   ROM exercises- gradually increase  0-2 weeks 0-40 degrees  2-4 weeks 0-60 degrees   4-6 weeks=0-90 degrees   Seated passive flexion   Active assisted extension   Quadriceps isometrics   Straight leg raise with brace locked out at 0   Hip/CORE/ankle strengthening   Scar mobilization   Patella mobilizations- translate medially only  Modalities-stim OK except for MPFL repair/reconstructions (patella instability surgery)      PHASE II (6-12 weeks)  GOALS:   Improve ROM to 120  Improve quadriceps strength   Normalize gait   Wean off crutches and then out of brace     EXERCISES/RESTRICTIONS:   Brace unlocked to 60 with good quadriceps control.   Wean off crutches and then out of brace weeks 6-8 as long  Patient Education     Weakness with Uncertain Cause  Based on your exam today, the exact cause of your weakness is not certain. But your weakness does not seem to be a sign of a serious illness at this time. Keep an eye on your symptoms and get medical advice as instructed below.  Home care    Rest at home today. Don't over-exert yourself.    Take any medicine as prescribed.    For the next few days, drink extra fluids (unless your healthcare provider wants you to restrict fluids for other reasons). Don't skip meals.    Unless otherwise directed, continue to take any prescription medicines.    Contact your healthcare provider if you have any questions or concerns.  Follow-up care  Follow up with your healthcare provider, or as advised.  When to seek medical advice  Call your healthcare provider right away for any of the following:    Symptoms get worse    Symptoms don't start getting better within 2 days    Fever of 100.4  F (38  C) or higher, or as directed by your healthcare provider  Call 911  Call 911 for any of these:    Chest, arm, neck, jaw, or upper back pain    Trouble breathing    Numbness or weakness of the face, one arm, or one leg    Slurred speech, confusion, or trouble speaking, walking, or seeing    Blood in vomit or stool (black or red color)    Loss of consciousness    Severe headache  Date Last Reviewed: 10/1/2017    [id number] [employer]. [address] [place] [address], [place] [address]. All rights reserved. This information is not intended as a substitute for professional medical care. Always follow your healthcare professional's instructions.            "as good quad control, motion, gait and swelling minimal  Avoid aggressive flexion ROM   A/AAROM knee flexion exercises   Continue patellar mobilization - translate medially only  Progression to regular bike   Leg press when ROM >60 deg   Initiate forward step-up program   Wall slides   Proprioception program   Modalities OK     Subjective     Pt reports: doing well with no new s/s. Patient was out last week due to illness purposes.  He was compliant with home exercise program.  Response to previous treatment: As above  Functional change: Marked improvement in PROM, but Quad strength lags in eccentric and difficulty decending stairs    Pain: 0/10  Location: left knee      Objective     Jose C received therapeutic exercises to develop strength and ROM for 53 minutes including:    Elliptical @ L1 x 8 minutes and R3  TM: backward walk manually x 3 minutes    Seated: left knee extension with strap, emphasis on TKE 5 x 30"  Standing gastroc stretch 3/30" on slant board  Prone hip flexor/quad stretch with strap x 2 minutes intermittently  S/L: clam shells blue band 3/10 each  Quad set/SLR  2# 2/10   Prone: HSC 5# 2/10    AROM: left knee : 0- 120 degrees, no pain  Patella mob: medial only    Standing: Matrix Hip  Abduction 40# 3/10  Extension 40# 3/10    Forward step ups: 6" 2/10  Lateral step downs: (L) 1/15    Shuttle: alternating  DL 4 bands  3/10  Single (L) 3b # 3/10    Eccentric 0 to 40 degrees: 1/15 (AA) (previous)    Wall : sustain squats with green physio-ball 1 x 5, 30"    Re-bounder: red ball  2 leg to single leg ball throws x 30    Box: 26" to 18" squats with 10# x 20 each    BWD lunges 2/10 each    Home Exercises Provided and Patient Education Provided  Education provided:   - Quad strengthening and knee control    Written Home Exercises Provided: Patient instructed to cont prior HEP.  Exercises were reviewed and Jose C was able to demonstrate them prior to the end of the session.  Jose C demonstrated good  " understanding of the education provided.     See EMR under Media for exercises provided prior visit.    Assessment   Good tolerance with TE progression. No new s/s.    Jose C is progressing well towards his goals.   Pt prognosis is Excellent.     Pt will continue to benefit from skilled outpatient physical therapy to address the deficits listed in the problem list box on initial evaluation, provide pt/family education and to maximize pt's level of independence in the home and community environment.     Pt's spiritual, cultural and educational needs considered and pt agreeable to plan of care and goals.    Anticipated barriers to physical therapy: None    Goals: Short Term Goals: 4 weeks   90 degrees FLEXION (MET)  Improve Quad strength (PROGRESS)  Progress to next phase of rehab (MET)  Long Term Goals: 10 weeks   Progress to closed chain exercises (MET)  Improve eccentric knee control   Return to all ADL without difficulty  0-120 degrees PROM (MET)    Plan   PHASE III (12-20 weeks)  GOALS:  Full knee ROM   Improve quadriceps flexibility   Return to normal ADL   Independent in home therapy   EXERCISES/RESTRICTIONS:   Knee flexion ROM   Quad/Hamstring strengthening   -step up/step down   -progress squat program   Advanced Proprioception   Agility training   Elliptical OK, Bike OK   Modalities   Home exercise program     Continue POC with single leg loading as well for neuromuscular control purposes.    Raza Lau, PT

## 2021-01-03 ENCOUNTER — HEALTH MAINTENANCE LETTER (OUTPATIENT)
Age: 51
End: 2021-01-03

## 2021-10-10 ENCOUNTER — HEALTH MAINTENANCE LETTER (OUTPATIENT)
Age: 51
End: 2021-10-10

## 2021-12-15 ENCOUNTER — OFFICE VISIT (OUTPATIENT)
Dept: FAMILY MEDICINE | Facility: CLINIC | Age: 51
End: 2021-12-15
Payer: MEDICARE

## 2021-12-15 VITALS
WEIGHT: 170.8 LBS | HEIGHT: 70 IN | HEART RATE: 64 BPM | RESPIRATION RATE: 12 BRPM | BODY MASS INDEX: 24.45 KG/M2 | DIASTOLIC BLOOD PRESSURE: 84 MMHG | SYSTOLIC BLOOD PRESSURE: 120 MMHG | TEMPERATURE: 98.3 F

## 2021-12-15 DIAGNOSIS — Z23 HIGH PRIORITY FOR 2019-NCOV VACCINE: ICD-10-CM

## 2021-12-15 DIAGNOSIS — G89.4 CHRONIC PAIN SYNDROME: ICD-10-CM

## 2021-12-15 DIAGNOSIS — Z23 NEED FOR VACCINATION: ICD-10-CM

## 2021-12-15 DIAGNOSIS — F15.11 HISTORY OF METHAMPHETAMINE ABUSE (H): ICD-10-CM

## 2021-12-15 DIAGNOSIS — Z00.00 ENCOUNTER FOR MEDICARE ANNUAL WELLNESS EXAM: Primary | ICD-10-CM

## 2021-12-15 DIAGNOSIS — Z98.1 STATUS POST LUMBAR SPINAL FUSION: ICD-10-CM

## 2021-12-15 DIAGNOSIS — Z12.11 SCREEN FOR COLON CANCER: ICD-10-CM

## 2021-12-15 DIAGNOSIS — Z12.5 PROSTATE CANCER SCREENING: ICD-10-CM

## 2021-12-15 DIAGNOSIS — Z13.220 LIPID SCREENING: ICD-10-CM

## 2021-12-15 DIAGNOSIS — Z13.1 DIABETES MELLITUS SCREENING: ICD-10-CM

## 2021-12-15 LAB
ANION GAP SERPL CALCULATED.3IONS-SCNC: 12 MMOL/L (ref 5–18)
BUN SERPL-MCNC: 10 MG/DL (ref 8–22)
CALCIUM SERPL-MCNC: 9.8 MG/DL (ref 8.5–10.5)
CHLORIDE BLD-SCNC: 104 MMOL/L (ref 98–107)
CHOLEST SERPL-MCNC: 184 MG/DL
CO2 SERPL-SCNC: 25 MMOL/L (ref 22–31)
CREAT SERPL-MCNC: 0.76 MG/DL (ref 0.7–1.3)
FASTING STATUS PATIENT QL REPORTED: YES
GFR SERPL CREATININE-BSD FRML MDRD: >90 ML/MIN/1.73M2
GLUCOSE BLD-MCNC: 112 MG/DL (ref 70–125)
HDLC SERPL-MCNC: 57 MG/DL
LDLC SERPL CALC-MCNC: 113 MG/DL
POTASSIUM BLD-SCNC: 4.2 MMOL/L (ref 3.5–5)
PSA SERPL-MCNC: 0.76 UG/L (ref 0–3.5)
SODIUM SERPL-SCNC: 141 MMOL/L (ref 136–145)
TRIGL SERPL-MCNC: 71 MG/DL

## 2021-12-15 PROCEDURE — G0439 PPPS, SUBSEQ VISIT: HCPCS | Performed by: FAMILY MEDICINE

## 2021-12-15 PROCEDURE — 99213 OFFICE O/P EST LOW 20 MIN: CPT | Mod: 25 | Performed by: FAMILY MEDICINE

## 2021-12-15 PROCEDURE — 90472 IMMUNIZATION ADMIN EACH ADD: CPT | Performed by: FAMILY MEDICINE

## 2021-12-15 PROCEDURE — 0004A COVID-19,PF,PFIZER (12+ YRS): CPT | Performed by: FAMILY MEDICINE

## 2021-12-15 PROCEDURE — 80048 BASIC METABOLIC PNL TOTAL CA: CPT | Performed by: FAMILY MEDICINE

## 2021-12-15 PROCEDURE — 90682 RIV4 VACC RECOMBINANT DNA IM: CPT | Performed by: FAMILY MEDICINE

## 2021-12-15 PROCEDURE — 90715 TDAP VACCINE 7 YRS/> IM: CPT | Performed by: FAMILY MEDICINE

## 2021-12-15 PROCEDURE — 80061 LIPID PANEL: CPT | Performed by: FAMILY MEDICINE

## 2021-12-15 PROCEDURE — 91300 COVID-19,PF,PFIZER (12+ YRS): CPT | Performed by: FAMILY MEDICINE

## 2021-12-15 PROCEDURE — G0103 PSA SCREENING: HCPCS | Performed by: FAMILY MEDICINE

## 2021-12-15 PROCEDURE — 90471 IMMUNIZATION ADMIN: CPT | Performed by: FAMILY MEDICINE

## 2021-12-15 PROCEDURE — 36415 COLL VENOUS BLD VENIPUNCTURE: CPT | Performed by: FAMILY MEDICINE

## 2021-12-15 ASSESSMENT — ANXIETY QUESTIONNAIRES
3. WORRYING TOO MUCH ABOUT DIFFERENT THINGS: SEVERAL DAYS
7. FEELING AFRAID AS IF SOMETHING AWFUL MIGHT HAPPEN: SEVERAL DAYS
IF YOU CHECKED OFF ANY PROBLEMS ON THIS QUESTIONNAIRE, HOW DIFFICULT HAVE THESE PROBLEMS MADE IT FOR YOU TO DO YOUR WORK, TAKE CARE OF THINGS AT HOME, OR GET ALONG WITH OTHER PEOPLE: SOMEWHAT DIFFICULT
1. FEELING NERVOUS, ANXIOUS, OR ON EDGE: SEVERAL DAYS
GAD7 TOTAL SCORE: 4
2. NOT BEING ABLE TO STOP OR CONTROL WORRYING: SEVERAL DAYS
6. BECOMING EASILY ANNOYED OR IRRITABLE: NOT AT ALL
4. TROUBLE RELAXING: NOT AT ALL
5. BEING SO RESTLESS THAT IT IS HARD TO SIT STILL: NOT AT ALL

## 2021-12-15 ASSESSMENT — ACTIVITIES OF DAILY LIVING (ADL): CURRENT_FUNCTION: NO ASSISTANCE NEEDED

## 2021-12-15 ASSESSMENT — MIFFLIN-ST. JEOR: SCORE: 1632.02

## 2021-12-15 ASSESSMENT — PATIENT HEALTH QUESTIONNAIRE - PHQ9: SUM OF ALL RESPONSES TO PHQ QUESTIONS 1-9: 4

## 2021-12-15 NOTE — ASSESSMENT & PLAN NOTE
Given the 6 level fusion and limitations of movement and pain is on full flexion of stability.  Patient lives he does not want to use any opioids especially given his previous methamphetamine use history.  Has found good relief with cannabis.  As such we will confirm diagnosis and the [place] cannabis program to help with more consistent and reliable treatment.

## 2021-12-15 NOTE — ASSESSMENT & PLAN NOTE
With chronic pain and limitations resulting in disability rating. Continue current use of NSAIDS and tylenol. Will also confirm to [place] medical cannibis program of the diagnosis.

## 2021-12-15 NOTE — PATIENT INSTRUCTIONS
Patient Education   Personalized Prevention Plan  You are due for the preventive services outlined below.  Your care team is available to assist you in scheduling these services.  If you have already completed any of these items, please share that information with your care team to update in your medical record.  Health Maintenance Due   Topic Date Due     ANNUAL REVIEW OF HM ORDERS  Never done     Colorectal Cancer Screening  Never done     LUNG CANCER SCREENING  Never done     Diptheria Tetanus Pertussis (DTAP/TDAP/TD) Vaccine (2 - Td or Tdap) 08/23/2021     Flu Vaccine (1) Never done     COVID-19 Vaccine (3 - Booster for Pfizer series) 11/22/2021       Exercise for a Healthier Heart  You may wonder how you can improve the health of your heart. If you re thinking about exercise, you re on the right track. You don t need to become an athlete. But you do need a certain amount of brisk exercise to help strengthen your heart. If you have been diagnosed with a heart condition, your healthcare provider may advise exercise to help stabilize your condition. To help make exercise a habit, choose safe, fun activities.      Exercise with a friend. When activity is fun, you're more likely to stick with it.   Before you start  Check with your healthcare provider before starting an exercise program. This is especially important if you have not been active for a while. It's also important if you have a long-term (chronic) health problem such as heart disease, diabetes, or obesity. Or if you are at high risk for having these problems.   Why exercise?  Exercising regularly offers many healthy rewards. It can help you do all of the following:     Improve your blood cholesterol level to help prevent further heart trouble    Lower your blood pressure to help prevent a stroke or heart attack    Control diabetes, or reduce your risk of getting this disease    Improve your heart and lung function    Reach and stay at a healthy  weight    Make your muscles stronger so you can stay active    Prevent falls and fractures by slowing the loss of bone mass (osteoporosis)    Manage stress better    Reduce your blood pressure    Improve your sense of self and your body image  Exercise tips      Ease into your routine. Set small goals. Then build on them. If you are not sure what your activity level should be, talk with your healthcare provider first before starting an exercise routine.    Exercise on most days. Aim for a total of 150 minutes (2 hours and 30 minutes) or more of moderate-intensity aerobic activity each week. Or 75 minutes (1 hour and 15 minutes) or more of vigorous-intensity aerobic activity each week. Or try for a combination of both. Moderate activity means that you breathe heavier and your heart rate increases but you can still talk. Think about doing 40 minutes of moderate exercise, 3 to 4 times a week. For best results, activity should last for about 40 minutes to lower blood pressure and cholesterol. It's OK to work up to the 40-minute period over time. Examples of moderate-intensity activity are walking 1 mile in 15 minutes. Or doing 30 to 45 minutes of yard work.    Step up your daily activity level.  Along with your exercise program, try being more active the whole day. Walk instead of drive. Or park further away so that you take more steps each day. Do more household tasks or yard work. You may not be able to meet the advised mount of physical activity. But doing some moderate- or vigorous-intensity aerobic activity can help reduce your risk for heart disease. Your healthcare provider can help you figure out what is best for you.    Choose 1 or more activities you enjoy.  Walking is one of the easiest things you can do. You can also try swimming, riding a bike, dancing, or taking an exercise class.    When to call your healthcare provider  Call your healthcare provider if you have any of these:     Chest pain or feel dizzy  or lightheaded    Burning, tightness, pressure, or heaviness in your chest, neck, shoulders, back, or arms    Abnormal shortness of breath    More joint or muscle pain    A very fast or irregular heartbeat (palpitations)  [employer] last reviewed this educational content on 7/1/2019 2000-2021 [employer]. All rights reserved. This information is not intended as a substitute for professional medical care. Always follow your healthcare professional's instructions.         Patient Education   Alcohol Use     Many people can enjoy a glass of wine or beer without any negative consequences to their health. According to the Centers for Disease Control and Prevention (CDC), having one or fewer drinks per day for women and two or fewer per day for men is considered moderate drinking.     When people drink more than moderately, it can become concerning. Excessive drinking is defined as consuming 15 drinks or more per week for men and 8 drinks or more per week for women. There are various health problems associated with excessive drinking, which include:    Damage to vital organs like the heart, brain, liver and pancreas    Harm to the digestive tract    Weaken the immune system    Higher risk for heart disease and cancer    There are many resources available to people who are addicted to alcohol. A counselor or other health care provider can give you support. So can a , , or rabbi who is trained in substance abuse counseling. Friends and family may also help once you are connected with professionals.

## 2021-12-15 NOTE — PROGRESS NOTES
"SUBJECTIVE:   Dayron Rick is a 51 year old male who presents for Preventive Visit.      Patient has been advised of split billing requirements and indicates understanding: Yes   Are you in the first 12 months of your Medicare coverage?  No    Denies any chest pain, shortness of breath, dyspnea exertion, palpitations, nausea or vomiting.  Denies any changes in vision or hearing, or urinary or bowel habits.    Significant chronic back pain.  He is now off all medications.  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  He does use marijuana 2-3 times a week.  Also went over his alcohol intake which at times does include 3-5 drinks in a day.  Discussed of reducing that down.  Also the fact that he uses alcohol-relieve the pain.  Discussed with patient that marijuana is a better option as well as other medications.  He says he does want be on pills but also pointed out to patient that he is currently taking medication that one time was actually used however we now know there are better options with less long-term effects.  As such she will cut back on drinking.    Healthy Habits:     In general, how would you rate your overall health?  Good    Frequency of exercise:  1 day/week    Duration of exercise:  15-30 minutes    Do you usually eat at least 4 servings of fruit and vegetables a day, include whole grains    & fiber and avoid regularly eating high fat or \"junk\" foods?  Yes    Taking medications regularly:  Not Applicable    Barriers to taking medications:  Not applicable    Medication side effects:  Not applicable    Ability to successfully perform activities of daily living:  No assistance needed    Home Safety:  No safety concerns identified    Hearing Impairment:  No hearing concerns    In the past 6 months, have you been bothered by leaking of urine?  No    In general, how would you rate your overall mental or emotional health?  Good      PHQ-2 Total Score: 0    Additional concerns today:  " Yes  Imm/Inj      Do you feel safe in your environment? Yes    Have you ever done Advance Care Planning? (For example, a Health Directive, POLST, or a discussion with a medical provider or your loved ones about your wishes): No, advance care planning information given to patient to review.  Advanced care planning was discussed at today's visit.       Fall risk  Fallen 2 or more times in the past year?: No  Any fall with injury in the past year?: No    Cognitive Screening   1) Repeat 3 items (Leader, Season, Table)    2) Clock draw: NORMAL  3) 3 item recall: Recalls 3 objects  Results: 3 items recalled: COGNITIVE IMPAIRMENT LESS LIKELY    Mini-CogTM Copyright S Atilio. Licensed by the author for use in [facility]; reprinted with permission (gill@[email or web address]). All rights reserved.        Reviewed and updated as needed this visit by clinical staff  Tobacco  Allergies  Meds  Problems  Med Hx  Surg Hx  Fam Hx  Soc Hx         Reviewed and updated as needed this visit by Provider  Tobacco  Allergies  Meds  Problems  Med Hx  Surg Hx  Fam Hx        Social History     Tobacco Use     Smoking status: Current Every Day Smoker     Packs/day: 0.50     Smokeless tobacco: Never Used   Substance Use Topics     Alcohol use: Yes     Alcohol/week: 12.0 standard drinks     Types: 12 Glasses of wine per week         Alcohol Use 12/15/2021   Prescreen: >3 drinks/day or >7 drinks/week? Yes   AUDIT SCORE  9       Current providers sharing in care for this patient include:   Patient Care Team:  No Ref-Primary, Physician as PCP - Gladys Em APRN CNP as Assigned PCP    The following health maintenance items are reviewed in Epic and correct as of today:  Health Maintenance Due   Topic Date Due     COLORECTAL CANCER SCREENING  Never done     LUNG CANCER SCREENING  Never done     Lab work is in process      Review of Systems   All other systems reviewed and are negative.      OBJECTIVE:   /84  "(BP Location: Left arm, Patient Position: Sitting, Cuff Size: Adult Large)   Pulse 64   Temp 98.3  F (36.8  C) (Oral)   Resp 12   Ht 1.772 m (5' 9.75\")   Wt 77.5 kg (170 lb 12.8 oz)   BMI 24.68 kg/m   Estimated body mass index is 24.68 kg/m  as calculated from the following:    Height as of this encounter: 1.772 m (5' 9.75\").    Weight as of this encounter: 77.5 kg (170 lb 12.8 oz).  Physical Exam  Vitals and nursing note reviewed.   Constitutional:       General: He is not in acute distress.     Appearance: Normal appearance.   HENT:      Head: Normocephalic and atraumatic.      Right Ear: Tympanic membrane, ear canal and external ear normal.      Left Ear: Tympanic membrane, ear canal and external ear normal.      Nose: Nose normal.      Mouth/Throat:      Mouth: Mucous membranes are moist.      Pharynx: Oropharynx is clear. No oropharyngeal exudate.   Eyes:      General: No scleral icterus.     Extraocular Movements: Extraocular movements intact.      Conjunctiva/sclera: Conjunctivae normal.   Neck:      Vascular: No carotid bruit.   Cardiovascular:      Rate and Rhythm: Normal rate and regular rhythm.      Pulses: Normal pulses.      Heart sounds: Normal heart sounds. No murmur heard.  No friction rub. No gallop.    Pulmonary:      Effort: Pulmonary effort is normal.      Breath sounds: Normal breath sounds. No wheezing, rhonchi or rales.   Musculoskeletal:         General: No swelling. Normal range of motion.      Cervical back: Normal range of motion.      Right lower leg: No edema.      Left lower leg: No edema.   Skin:     General: Skin is warm and dry.      Capillary Refill: Capillary refill takes less than 2 seconds.      Coloration: Skin is not jaundiced.      Findings: No rash.   Neurological:      General: No focal deficit present.      Mental Status: He is alert and oriented to person, place, and time.      Cranial Nerves: No cranial nerve deficit.      Gait: Gait is intact. Gait normal.      " Deep Tendon Reflexes:      Reflex Scores:       Bicep reflexes are 2+ on the right side and 2+ on the left side.       Patellar reflexes are 2+ on the right side and 2+ on the left side.  Psychiatric:         Mood and Affect: Mood normal.         Thought Content: Thought content normal.           ASSESSMENT / PLAN:     Problem List Items Addressed This Visit        Nervous and Auditory    Chronic pain syndrome     Given the 6 level fusion and limitations of movement and pain is on full flexion of stability.  Patient lives he does not want to use any opioids especially given his previous methamphetamine use history.  Has found good relief with cannabis.  As such we will confirm diagnosis and the [place] cannabis program to help with more consistent and reliable treatment.            Other    Status post lumbar spinal fusion     With chronic pain and limitations resulting in disability rating. Continue current use of NSAIDS and tylenol. Will also confirm to [place] medical cannibis program of the diagnosis.         History of methamphetamine abuse (H)      Other Visit Diagnoses     Encounter for Medicare annual wellness exam    -  Primary    Screen for colon cancer        Relevant Orders    Adult Gastro Ref - Procedure Only    High priority for 2019-nCoV vaccine        Relevant Orders    COVID-19,PF,PFIZER (12+ Yrs PURPLE LABEL) (Completed)    Need for vaccination        Relevant Orders    [place] RIV4 (FLUBLOK) VACCINE RECOMBINANT DNA PRSRV ANTIBIO FREE, IM ([id number]) (Completed)    TDAP VACCINE (Adacel, Boostrix)  [[id number]] (Completed)    Diabetes mellitus screening        Relevant Orders    Basic metabolic panel    Lipid screening        Relevant Orders    Lipid Profile    Prostate cancer screening        Relevant Orders    Prostate Specific Antigen Screen          Patient has been advised of split billing requirements and indicates understanding: Yes  COUNSELING:  Reviewed preventive health counseling, as reflected in  "patient instructions       Regular exercise       Healthy diet/nutrition       Alcohol Use        Colon cancer screening       Prostate cancer screening    Estimated body mass index is 24.68 kg/m  as calculated from the following:    Height as of this encounter: 1.772 m (5' 9.75\").    Weight as of this encounter: 77.5 kg (170 lb 12.8 oz).        He reports that he has been smoking. He has been smoking about 0.50 packs per day. He has never used smokeless tobacco.  Tobacco Cessation Action Plan:   Information offered: Patient not interested at this time      Appropriate preventive services were discussed with this patient, including applicable screening as appropriate for cardiovascular disease, diabetes, osteopenia/osteoporosis, and glaucoma.  As appropriate for age/gender, discussed screening for colorectal cancer, prostate cancer, breast cancer, and cervical cancer. Checklist reviewing preventive services available has been given to the patient.    Reviewed patients plan of care and provided an AVS. The Intermediate Care Plan ( asthma action plan, low back pain action plan, and migraine action plan) for Dayron meets the Care Plan requirement. This Care Plan has been established and reviewed with the Patient.    Counseling Resources:  ATP IV Guidelines  Pooled Cohorts Equation Calculator  Breast Cancer Risk Calculator  Breast Cancer: Medication to Reduce Risk  FRAX Risk Assessment  ICSI Preventive Guidelines  Dietary Guidelines for Americans, 2010  USDA's MyPlate  ASA Prophylaxis  Lung CA Screening    William Eddy DO  [facility]    Identified Health Risks:    He is at risk for lack of exercise and has been provided with information to increase physical activity for the benefit of his well-being.  The patient reports that he drinks more than one alcoholic drink per day and sometimes engages in binge or excessive drinking. He was counseled and given information about possible " harmful effects of excessive alcohol intake as well as where to get help for alcohol problems.

## 2021-12-16 ASSESSMENT — ANXIETY QUESTIONNAIRES: GAD7 TOTAL SCORE: 4

## 2021-12-26 ENCOUNTER — APPOINTMENT (OUTPATIENT)
Dept: CT IMAGING | Facility: CLINIC | Age: 51
End: 2021-12-26
Payer: MEDICARE

## 2021-12-26 ENCOUNTER — HOSPITAL ENCOUNTER (EMERGENCY)
Facility: CLINIC | Age: 51
Discharge: HOME | End: 2021-12-26
Admitting: EMERGENCY MEDICINE
Payer: MEDICARE

## 2021-12-26 VITALS
TEMPERATURE: 98.1 F | DIASTOLIC BLOOD PRESSURE: 85 MMHG | WEIGHT: 180 LBS | RESPIRATION RATE: 20 BRPM | OXYGEN SATURATION: 96 % | BODY MASS INDEX: 26.01 KG/M2 | HEART RATE: 59 BPM | SYSTOLIC BLOOD PRESSURE: 136 MMHG

## 2021-12-26 DIAGNOSIS — N20.1 URETEROLITHIASIS: ICD-10-CM

## 2021-12-26 DIAGNOSIS — R10.9 FLANK PAIN: ICD-10-CM

## 2021-12-26 LAB
ALBUMIN UR-MCNC: 10 MG/DL
ANION GAP SERPL CALCULATED.3IONS-SCNC: 12 MMOL/L (ref 5–18)
APPEARANCE UR: CLEAR
BASOPHILS # BLD AUTO: 0.1 10E3/UL (ref 0–0.2)
BASOPHILS NFR BLD AUTO: 1 %
BILIRUB UR QL STRIP: NEGATIVE
BUN SERPL-MCNC: 14 MG/DL (ref 8–22)
C REACTIVE PROTEIN LHE: 0.1 MG/DL (ref 0–0.8)
CALCIUM SERPL-MCNC: 9.1 MG/DL (ref 8.5–10.5)
CHLORIDE BLD-SCNC: 109 MMOL/L (ref 98–107)
CO2 SERPL-SCNC: 21 MMOL/L (ref 22–31)
COLOR UR AUTO: ABNORMAL
CREAT SERPL-MCNC: 0.89 MG/DL (ref 0.7–1.3)
EOSINOPHIL # BLD AUTO: 0.1 10E3/UL (ref 0–0.7)
EOSINOPHIL NFR BLD AUTO: 2 %
ERYTHROCYTE [DISTWIDTH] IN BLOOD BY AUTOMATED COUNT: 13.2 % (ref 10–15)
GFR SERPL CREATININE-BSD FRML MDRD: >90 ML/MIN/1.73M2
GLUCOSE BLD-MCNC: 115 MG/DL (ref 70–125)
GLUCOSE UR STRIP-MCNC: NEGATIVE MG/DL
HCT VFR BLD AUTO: 42.4 % (ref 40–53)
HGB BLD-MCNC: 14.6 G/DL (ref 13.3–17.7)
HGB UR QL STRIP: ABNORMAL
IMM GRANULOCYTES # BLD: 0 10E3/UL
IMM GRANULOCYTES NFR BLD: 0 %
KETONES UR STRIP-MCNC: ABNORMAL MG/DL
LEUKOCYTE ESTERASE UR QL STRIP: NEGATIVE
LYMPHOCYTES # BLD AUTO: 1.7 10E3/UL (ref 0.8–5.3)
LYMPHOCYTES NFR BLD AUTO: 24 %
MCH RBC QN AUTO: 33.6 PG (ref 26.5–33)
MCHC RBC AUTO-ENTMCNC: 34.4 G/DL (ref 31.5–36.5)
MCV RBC AUTO: 98 FL (ref 78–100)
MONOCYTES # BLD AUTO: 0.5 10E3/UL (ref 0–1.3)
MONOCYTES NFR BLD AUTO: 7 %
MUCOUS THREADS #/AREA URNS LPF: PRESENT /LPF
NEUTROPHILS # BLD AUTO: 4.9 10E3/UL (ref 1.6–8.3)
NEUTROPHILS NFR BLD AUTO: 66 %
NITRATE UR QL: NEGATIVE
NRBC # BLD AUTO: 0 10E3/UL
NRBC BLD AUTO-RTO: 0 /100
PH UR STRIP: 7.5 [PH] (ref 5–7)
PLATELET # BLD AUTO: 324 10E3/UL (ref 150–450)
POTASSIUM BLD-SCNC: 4.6 MMOL/L (ref 3.5–5)
RBC # BLD AUTO: 4.34 10E6/UL (ref 4.4–5.9)
RBC URINE: 39 /HPF
SODIUM SERPL-SCNC: 142 MMOL/L (ref 136–145)
SP GR UR STRIP: 1.02 (ref 1–1.03)
SQUAMOUS EPITHELIAL: <1 /HPF
UROBILINOGEN UR STRIP-MCNC: <2 MG/DL
WBC # BLD AUTO: 7.3 10E3/UL (ref 4–11)
WBC URINE: 1 /HPF

## 2021-12-26 PROCEDURE — 80048 BASIC METABOLIC PNL TOTAL CA: CPT | Performed by: NURSE PRACTITIONER

## 2021-12-26 PROCEDURE — 85025 COMPLETE CBC W/AUTO DIFF WBC: CPT | Performed by: NURSE PRACTITIONER

## 2021-12-26 PROCEDURE — 250N000011 HC RX IP 250 OP 636: Performed by: NURSE PRACTITIONER

## 2021-12-26 PROCEDURE — 36415 COLL VENOUS BLD VENIPUNCTURE: CPT | Performed by: NURSE PRACTITIONER

## 2021-12-26 PROCEDURE — 96375 TX/PRO/DX INJ NEW DRUG ADDON: CPT

## 2021-12-26 PROCEDURE — 99285 EMERGENCY DEPT VISIT HI MDM: CPT | Mod: 25

## 2021-12-26 PROCEDURE — 250N000013 HC RX MED GY IP 250 OP 250 PS 637: Performed by: NURSE PRACTITIONER

## 2021-12-26 PROCEDURE — 74176 CT ABD & PELVIS W/O CONTRAST: CPT

## 2021-12-26 PROCEDURE — 86141 C-REACTIVE PROTEIN HS: CPT | Performed by: NURSE PRACTITIONER

## 2021-12-26 PROCEDURE — 81001 URINALYSIS AUTO W/SCOPE: CPT | Performed by: NURSE PRACTITIONER

## 2021-12-26 PROCEDURE — 96374 THER/PROPH/DIAG INJ IV PUSH: CPT

## 2021-12-26 RX ORDER — ONDANSETRON 2 MG/ML
4 INJECTION INTRAMUSCULAR; INTRAVENOUS
Status: COMPLETED | OUTPATIENT
Start: 2021-12-26 | End: 2021-12-26

## 2021-12-26 RX ORDER — ONDANSETRON 4 MG/1
4 TABLET, ORALLY DISINTEGRATING ORAL EVERY 8 HOURS PRN
Qty: 12 TABLET | Refills: 0 | Status: SHIPPED | OUTPATIENT
Start: 2021-12-26 | End: 2021-12-29

## 2021-12-26 RX ORDER — IBUPROFEN 200 MG
400 TABLET ORAL EVERY 6 HOURS
Qty: 56 TABLET | Refills: 0 | Status: SHIPPED | OUTPATIENT
Start: 2021-12-26 | End: 2022-01-02

## 2021-12-26 RX ORDER — DIMENHYDRINATE 50 MG
50 TABLET ORAL EVERY 6 HOURS PRN
Qty: 28 TABLET | Refills: 0 | Status: SHIPPED | OUTPATIENT
Start: 2021-12-26 | End: 2022-01-02

## 2021-12-26 RX ORDER — ACETAMINOPHEN 500 MG
1000 TABLET ORAL EVERY 6 HOURS
Qty: 56 TABLET | Refills: 0 | Status: SHIPPED | OUTPATIENT
Start: 2021-12-26 | End: 2022-01-02

## 2021-12-26 RX ORDER — KETOROLAC TROMETHAMINE 15 MG/ML
15 INJECTION, SOLUTION INTRAMUSCULAR; INTRAVENOUS ONCE
Status: COMPLETED | OUTPATIENT
Start: 2021-12-26 | End: 2021-12-26

## 2021-12-26 RX ORDER — ACETAMINOPHEN 325 MG/1
975 TABLET ORAL ONCE
Status: COMPLETED | OUTPATIENT
Start: 2021-12-26 | End: 2021-12-26

## 2021-12-26 RX ADMIN — ACETAMINOPHEN 975 MG: 325 TABLET ORAL at 10:09

## 2021-12-26 RX ADMIN — ONDANSETRON 4 MG: 2 INJECTION INTRAMUSCULAR; INTRAVENOUS at 10:05

## 2021-12-26 RX ADMIN — KETOROLAC TROMETHAMINE 15 MG: 15 INJECTION, SOLUTION INTRAMUSCULAR; INTRAVENOUS at 10:05

## 2021-12-26 RX ADMIN — Medication 50 MG: at 10:10

## 2021-12-26 ASSESSMENT — ENCOUNTER SYMPTOMS
FREQUENCY: 0
HEMATURIA: 0
NAUSEA: 1
FEVER: 0
FLANK PAIN: 1
DYSURIA: 0
ABDOMINAL PAIN: 1
VOMITING: 0

## 2021-12-26 NOTE — DISCHARGE INSTRUCTIONS
Follow instructions as outlined in the [facility] kidney stone discharge paperwork    You should receive a phone call in 1-2 business days to schedule follow-up.    Return for worsening pain, fever, uncontrolled vomiting, or other new/worsening symptoms or other concerns

## 2021-12-26 NOTE — ED PROVIDER NOTES
EMERGENCY DEPARTMENT ENCOUNTER      NAME: Dayron Rick  AGE: 51 year old male  YOB: 1970  MRN: [id number]  EVALUATION DATE & TIME: 2021  9:36 AM    PCP: William Eddy    ED PROVIDER: NELLIE Anderson, CNP      Chief Complaint   Patient presents with     Flank Pain         FINAL IMPRESSION:  1. Ureterolithiasis    2. Flank pain          ED COURSE & MEDICAL DECISION MAKIN:40 AM I met with the patient, obtained history, performed an initial exam, and discussed options and plan for treatment here in the ED.  10:26 AM Rechecked patient. Pain improved. Patient going to CT now.  10:55 AM Rechecked and updated the patient. CT shows obstructing 3 mm right ureterovesical junction calculus and mild right hydroureteronephrosis.  12:19 PM We discussed the plan for discharge and the patient is agreeable. Reviewed supportive cares, symptomatic treatment, outpatient follow up, and reasons to return to the Emergency Department. Patient to be discharged by ED RN.     Pertinent Labs & Imaging studies reviewed. (See chart for details)  51 year old male presents to the Emergency Department for evaluation of flank pain.  CT scan does show a 3 mm stone right distal ureter at the UVJ.  Kidney function is normal.  UA does not show any evidence for infection.  Felt significantly better after receiving Zofran and Toradol.  Given instructions regarding ongoing symptom management.  Will refer to kidney stone [place] for ongoing management.  Was also given return precautions    At the conclusion of the encounter I discussed the results of all of the tests and the disposition. The questions were answered. The patient or family acknowledged understanding and was agreeable with the care plan.       MEDICATIONS GIVEN IN THE EMERGENCY:  Medications   ketorolac (TORADOL) injection 15 mg (15 mg Intravenous Given 21 1005)   dimenhyDRINATE chew tab 50 mg (50 mg Oral Given 21 1010)    acetaminophen (TYLENOL) tablet 975 mg (975 mg Oral Given 12/26/21 1009)   ondansetron (ZOFRAN) injection 4 mg (4 mg Intravenous Given 12/26/21 1005)       NEW PRESCRIPTIONS STARTED AT TODAY'S ER VISIT  New Prescriptions    ACETAMINOPHEN (TYLENOL) 500 MG TABLET    Take 2 tablets (1,000 mg) by mouth every 6 hours for 7 days    DIMENHYDRINATE (DRAMAMINE) 50 MG TABLET    Take 1 tablet (50 mg) by mouth every 6 hours as needed for other (kidney stone pain management)    IBUPROFEN (ADVIL/MOTRIN) 200 MG TABLET    Take 2 tablets (400 mg) by mouth every 6 hours for 7 days    ONDANSETRON (ZOFRAN-ODT) 4 MG ODT TAB    Take 1 tablet (4 mg) by mouth every 8 hours as needed for nausea            =================================================================    HPI    Patient information was obtained from: Patient    Use of Intrepreter: N/A         Dayron Rick is a 51 year old male with a history of chronic pain syndrome, tobacco use, s/p lumbar spinal fusion who presents to the ED by walk in for evaluation of flank pain.    The patient reports sudden onset of right flank pain this morning. The pain comes in waves and wraps to the right lower quadrant. He endorses associated nausea. He reports no history of similar pain and no history of kidney stones, but he thinks his mom had a history of kidney stones. He denies any recent injury or strain. He has not taken any medication for the pain. He denies any dysuria, urinary frequency, hematuria, fever, or any other complaints at this time.    REVIEW OF SYSTEMS   Review of Systems   Constitutional: Negative for fever.   Gastrointestinal: Positive for abdominal pain (RLQ) and nausea. Negative for vomiting.   Genitourinary: Positive for flank pain (right). Negative for dysuria, frequency and hematuria.   All other systems reviewed and are negative.       PAST MEDICAL HISTORY:  Past Medical History:   Diagnosis Date     Substance abuse (H)        PAST SURGICAL HISTORY:  Past  Surgical History:   Procedure Laterality Date     FUSION LUMBAR ANTERIOR, FUSION LUMBAR POSTERIOR THREE+ LEVELS, COMBINED  2013     TONSILLECTOMY             CURRENT MEDICATIONS:    None           ALLERGIES:  Allergies   Allergen Reactions     Adhesive Tape      Steri strips caused blisters       FAMILY HISTORY:  Family History   Problem Relation Age of Onset     Diabetes Mother          at 58, unsure of cause of death     Diabetes Type 2  Father      Heart Disease Father 78        quadruple bypass     Cardiovascular Father 79        stroke     Prostate Problems Father      No Known Problems Son      No Known Problems Son      Cancer Paternal Grandfather         stomach cancer     Chronic Obstructive Pulmonary Disease Brother      No Known Problems Sister        SOCIAL HISTORY:   Social History     Socioeconomic History     Marital status:      Spouse name: None     Number of children: 2     Years of education: 13     Highest education level: Some college, no degree   Occupational History     Occupation: Disabled   Tobacco Use     Smoking status: Current Every Day Smoker     Packs/day: 0.50     Smokeless tobacco: Never Used   Vaping Use     Vaping Use: Never used   Substance and Sexual Activity     Alcohol use: Yes     Alcohol/week: 12.0 standard drinks     Types: 12 Glasses of wine per week     Drug use: Not Currently     Types: Marijuana, Methamphetamines     Comment: HX     Sexual activity: Yes     Partners: Female     Birth control/protection: Post-menopausal     Comment: Partner = Post-menopausal   Other Topics Concern     None   Social History Narrative     None     Social Determinants of Health     Financial Resource Strain: Not on file   Food Insecurity: Not on file   Transportation Needs: Not on file   Physical Activity: Not on file   Stress: Not on file   Social Connections: Not on file   Intimate Partner Violence: Not on file   Housing Stability: Not on file         VITALS:  Patient Vitals  for the past 24 hrs:   BP Temp Temp src Pulse Resp SpO2 Weight   12/26/21 0933 (!) 158/101 98.1  F (36.7  C) Oral 65 20 96 % 81.6 kg (180 lb)       PHYSICAL EXAM    Constitutional: Alert, no distress but does appear uncomfortable  EYES: Conjunctivae clear  HENT:  Atraumatic, normocephalic  Respiratory:  No respiratory distress, normal breath sounds  Cardiovascular:  Normal rate, normal rhythm, no murmurs  GI:  Soft, nondistended, right lower quadrant tenderness, no rebound, no guarding  Integument: Warm, Dry  Neurologic:  Alert & oriented x 3              LAB:  All pertinent labs reviewed and interpreted.  Results for orders placed or performed during the hospital encounter of 12/26/21   CT Abdomen Pelvis w/o Contrast    Impression    IMPRESSION:   1.  Mild right hydroureteronephrosis with an obstructing 3 mm right ureterovesical junction calculus.    2.  No left renal calculi or left hydronephrosis.     Basic metabolic panel   Result Value Ref Range    Sodium 142 136 - 145 mmol/L    Potassium 4.6 3.5 - 5.0 mmol/L    Chloride 109 (H) 98 - 107 mmol/L    Carbon Dioxide (CO2) 21 (L) 22 - 31 mmol/L    Anion Gap 12 5 - 18 mmol/L    Urea Nitrogen 14 8 - 22 mg/dL    Creatinine 0.89 0.70 - 1.30 mg/dL    Calcium 9.1 8.5 - 10.5 mg/dL    Glucose 115 70 - 125 mg/dL    GFR Estimate >90 >60 mL/min/1.73m2   UA with Microscopic reflex to Culture    Specimen: Urine, Midstream   Result Value Ref Range    Color Urine Light Yellow Colorless, Straw, Light Yellow, Yellow    Appearance Urine Clear Clear    Glucose Urine Negative Negative mg/dL    Bilirubin Urine Negative Negative    Ketones Urine Trace (A) Negative mg/dL    Specific Gravity Urine 1.020 1.001 - 1.030    Blood Urine 0.5 mg/dL (A) Negative    pH Urine 7.5 (H) 5.0 - 7.0    Protein Albumin Urine 10  (A) Negative mg/dL    Urobilinogen Urine <2.0 <2.0 mg/dL    Nitrite Urine Negative Negative    Leukocyte Esterase Urine Negative Negative    Mucus Urine Present (A) None Seen /LPF     RBC Urine 39 (H) <=2 /HPF    WBC Urine 1 <=5 /HPF    Squamous Epithelials Urine <1 <=1 /HPF   C-Reactive Protein   Result Value Ref Range    CRP 0.1 0.0-<0.8 mg/dL   CBC with platelets and differential   Result Value Ref Range    WBC Count 7.3 4.0 - 11.0 10e3/uL    RBC Count 4.34 (L) 4.40 - 5.90 10e6/uL    Hemoglobin 14.6 13.3 - 17.7 g/dL    Hematocrit 42.4 40.0 - 53.0 %    MCV 98 78 - 100 fL    MCH 33.6 (H) 26.5 - 33.0 pg    MCHC 34.4 31.5 - 36.5 g/dL    RDW 13.2 10.0 - 15.0 %    Platelet Count 324 150 - 450 10e3/uL    % Neutrophils 66 %    % Lymphocytes 24 %    % Monocytes 7 %    % Eosinophils 2 %    % Basophils 1 %    % Immature Granulocytes 0 %    NRBCs per 100 WBC 0 <1 /100    Absolute Neutrophils 4.9 1.6 - 8.3 10e3/uL    Absolute Lymphocytes 1.7 0.8 - 5.3 10e3/uL    Absolute Monocytes 0.5 0.0 - 1.3 10e3/uL    Absolute Eosinophils 0.1 0.0 - 0.7 10e3/uL    Absolute Basophils 0.1 0.0 - 0.2 10e3/uL    Absolute Immature Granulocytes 0.0 <=0.4 10e3/uL    Absolute NRBCs 0.0 10e3/uL       RADIOLOGY:  Reviewed all pertinent imaging. Please see official radiology report.  CT Abdomen Pelvis w/o Contrast   Final Result   IMPRESSION:    1.  Mild right hydroureteronephrosis with an obstructing 3 mm right ureterovesical junction calculus.      2.  No left renal calculi or left hydronephrosis.               PROCEDURES:   None      I, Bertrand Lund, am serving as a scribe to document services personally performed by Ector Morelos CNP. based on my observation and the provider's statements to me. I, Ector Morelos CNP attest that Bertrand Lund is acting in a scribe capacity, has observed my performance of the services and has documented them in accordance with my direction.    NELLIE Anderson, CNP  Emergency Medicine  [facility] EMERGENCY ROOM  [address] [place] [address]  [phone]  Dept: [phone]         Ector Morelos, APRN  CNP  12/26/21 1227

## 2021-12-27 ENCOUNTER — TELEPHONE (OUTPATIENT)
Dept: UROLOGY | Facility: CLINIC | Age: 51
End: 2021-12-27
Payer: MEDICARE

## 2021-12-27 NOTE — TELEPHONE ENCOUNTER
Message left for patient to call clinic to set up an appointment for kidney stone follow-up.  Hien Jay RN

## 2021-12-28 ENCOUNTER — TELEPHONE (OUTPATIENT)
Dept: UROLOGY | Facility: CLINIC | Age: 51
End: 2021-12-28
Payer: MEDICARE

## 2022-09-18 ENCOUNTER — HEALTH MAINTENANCE LETTER (OUTPATIENT)
Age: 52
End: 2022-09-18

## 2023-01-29 ENCOUNTER — HEALTH MAINTENANCE LETTER (OUTPATIENT)
Age: 53
End: 2023-01-29

## 2024-02-25 ENCOUNTER — HEALTH MAINTENANCE LETTER (OUTPATIENT)
Age: 54
End: 2024-02-25

## 2024-08-23 ENCOUNTER — ALLIED HEALTH/NURSE VISIT (OUTPATIENT)
Dept: FAMILY MEDICINE | Facility: CLINIC | Age: 54
End: 2024-08-23
Payer: MEDICARE

## 2024-08-23 ENCOUNTER — HOSPITAL ENCOUNTER (OUTPATIENT)
Dept: GENERAL RADIOLOGY | Facility: CLINIC | Age: 54
Discharge: HOME | End: 2024-08-23
Attending: STUDENT IN AN ORGANIZED HEALTH CARE EDUCATION/TRAINING PROGRAM | Admitting: STUDENT IN AN ORGANIZED HEALTH CARE EDUCATION/TRAINING PROGRAM
Payer: MEDICARE

## 2024-08-23 ENCOUNTER — OFFICE VISIT (OUTPATIENT)
Dept: FAMILY MEDICINE | Facility: CLINIC | Age: 54
End: 2024-08-23
Payer: MEDICARE

## 2024-08-23 VITALS
TEMPERATURE: 98.8 F | DIASTOLIC BLOOD PRESSURE: 88 MMHG | HEIGHT: 70 IN | BODY MASS INDEX: 21.9 KG/M2 | WEIGHT: 153 LBS | RESPIRATION RATE: 16 BRPM | HEART RATE: 89 BPM | OXYGEN SATURATION: 97 % | SYSTOLIC BLOOD PRESSURE: 144 MMHG

## 2024-08-23 VITALS — SYSTOLIC BLOOD PRESSURE: 188 MMHG | DIASTOLIC BLOOD PRESSURE: 86 MMHG

## 2024-08-23 DIAGNOSIS — R03.0 ELEVATED BP WITHOUT DIAGNOSIS OF HYPERTENSION: Primary | ICD-10-CM

## 2024-08-23 DIAGNOSIS — F15.11 HISTORY OF METHAMPHETAMINE ABUSE (H): ICD-10-CM

## 2024-08-23 DIAGNOSIS — I16.0 ASYMPTOMATIC HYPERTENSIVE URGENCY: ICD-10-CM

## 2024-08-23 DIAGNOSIS — R94.31 NONSPECIFIC ABNORMAL ELECTROCARDIOGRAM (ECG) (EKG): ICD-10-CM

## 2024-08-23 DIAGNOSIS — R53.83 FATIGUE, UNSPECIFIED TYPE: ICD-10-CM

## 2024-08-23 DIAGNOSIS — I10 HIGH BLOOD PRESSURE: Primary | ICD-10-CM

## 2024-08-23 DIAGNOSIS — R03.0 ELEVATED BP WITHOUT DIAGNOSIS OF HYPERTENSION: ICD-10-CM

## 2024-08-23 LAB
ALBUMIN SERPL BCG-MCNC: 4.7 G/DL (ref 3.5–5.2)
ALP SERPL-CCNC: 84 U/L (ref 40–150)
ALT SERPL W P-5'-P-CCNC: 52 U/L (ref 0–70)
ANION GAP SERPL CALCULATED.3IONS-SCNC: 15 MMOL/L (ref 7–15)
AST SERPL W P-5'-P-CCNC: 51 U/L (ref 0–45)
BILIRUB SERPL-MCNC: 0.5 MG/DL
BUN SERPL-MCNC: 18.4 MG/DL (ref 6–20)
CALCIUM SERPL-MCNC: 9.6 MG/DL (ref 8.8–10.4)
CHLORIDE SERPL-SCNC: 95 MMOL/L (ref 98–107)
CREAT SERPL-MCNC: 0.95 MG/DL (ref 0.67–1.17)
EGFRCR SERPLBLD CKD-EPI 2021: >90 ML/MIN/1.73M2
ERYTHROCYTE [DISTWIDTH] IN BLOOD BY AUTOMATED COUNT: 12.8 % (ref 10–15)
GLUCOSE SERPL-MCNC: 133 MG/DL (ref 70–99)
HCO3 SERPL-SCNC: 28 MMOL/L (ref 22–29)
HCT VFR BLD AUTO: 46.6 % (ref 40–53)
HGB BLD-MCNC: 16.7 G/DL (ref 13.3–17.7)
HOLD SPECIMEN: NORMAL
MCH RBC QN AUTO: 35.1 PG (ref 26.5–33)
MCHC RBC AUTO-ENTMCNC: 35.8 G/DL (ref 31.5–36.5)
MCV RBC AUTO: 98 FL (ref 78–100)
PLATELET # BLD AUTO: 272 10E3/UL (ref 150–450)
POTASSIUM SERPL-SCNC: 4.1 MMOL/L (ref 3.4–5.3)
PROT SERPL-MCNC: 7.8 G/DL (ref 6.4–8.3)
RBC # BLD AUTO: 4.76 10E6/UL (ref 4.4–5.9)
SODIUM SERPL-SCNC: 138 MMOL/L (ref 135–145)
TSH SERPL DL<=0.005 MIU/L-ACNC: 3.52 UIU/ML (ref 0.3–4.2)
WBC # BLD AUTO: 8.8 10E3/UL (ref 4–11)

## 2024-08-23 PROCEDURE — 99207 PR NO CHARGE NURSE ONLY: CPT

## 2024-08-23 PROCEDURE — 99214 OFFICE O/P EST MOD 30 MIN: CPT | Performed by: STUDENT IN AN ORGANIZED HEALTH CARE EDUCATION/TRAINING PROGRAM

## 2024-08-23 PROCEDURE — 85027 COMPLETE CBC AUTOMATED: CPT | Performed by: STUDENT IN AN ORGANIZED HEALTH CARE EDUCATION/TRAINING PROGRAM

## 2024-08-23 PROCEDURE — 80053 COMPREHEN METABOLIC PANEL: CPT | Performed by: STUDENT IN AN ORGANIZED HEALTH CARE EDUCATION/TRAINING PROGRAM

## 2024-08-23 PROCEDURE — 84443 ASSAY THYROID STIM HORMONE: CPT | Performed by: STUDENT IN AN ORGANIZED HEALTH CARE EDUCATION/TRAINING PROGRAM

## 2024-08-23 PROCEDURE — 36415 COLL VENOUS BLD VENIPUNCTURE: CPT | Performed by: STUDENT IN AN ORGANIZED HEALTH CARE EDUCATION/TRAINING PROGRAM

## 2024-08-23 PROCEDURE — 71046 X-RAY EXAM CHEST 2 VIEWS: CPT

## 2024-08-23 PROCEDURE — 93000 ELECTROCARDIOGRAM COMPLETE: CPT | Performed by: STUDENT IN AN ORGANIZED HEALTH CARE EDUCATION/TRAINING PROGRAM

## 2024-08-23 SDOH — HEALTH STABILITY: PHYSICAL HEALTH: ON AVERAGE, HOW MANY DAYS PER WEEK DO YOU ENGAGE IN MODERATE TO STRENUOUS EXERCISE (LIKE A BRISK WALK)?: 2 DAYS

## 2024-08-23 ASSESSMENT — SOCIAL DETERMINANTS OF HEALTH (SDOH): HOW OFTEN DO YOU GET TOGETHER WITH FRIENDS OR RELATIVES?: MORE THAN THREE TIMES A WEEK

## 2024-08-23 ASSESSMENT — PAIN SCALES - GENERAL: PAINLEVEL: NO PAIN (0)

## 2024-08-23 NOTE — PROGRESS NOTES
RN asked to assess patient for elevated BP. RN arrived to patient and significant other sitting comfortable in the room. Patient appeared in no distress. Patient was conversing appropriately with staff. BP obtained, found to be 188/86. Patient denies chest pain and SOB. Denies headache and all other symptoms. Patient states he is feeling a little anxious and concerned that his BP his high. He has never been told he has high blood pressure in the past. He has felt increasing fatigue the past couple of days. RN listened to heart sounds and hearing irregularity. Huddled with Dr. Choudhury. Order placed for EKG. EKG performed by SYLVIA Penn for patient to wait until provider rounding.     Ruddy Crane RN on 8/23/2024 at 3:59 PM

## 2024-08-23 NOTE — PROGRESS NOTES
Assessment & Plan     Elevated BP without diagnosis of hypertension  Asymptomatic hypertensive urgency  Fatigue, unspecified type  Nonspecific abnormal electrocardiogram (ECG) (EKG)  EKG abnormal, but not alarming. SR with non-specific depression suggested, LVH (V1/V2) and possible irregular beat at end of tracing. BP in Hypertensive urgency range on arrival though asymptomatic. BP had returned almost to normal range by end of visit, but given no prior history of such high levels, patient was inclined to a cardiology referral to explore the abnormal EKG and any additional work up. Labs as ordered were largely wnl or only slightly out of range. CXR ordered and study completed, No concern for cardiac or vascular abnormalities noted, but mention of air trapping vs hyperinflation, perhaps related to smoking history/COPD? Can discuss at follow-up. Overall reassuring from a cardiac standpoint. Could consider cancelling cards consult, but extreme BP elevations may still be worth the discussion. May be anxiety related.    Patient is scheduled to return in 1 month to recheck and verify if elevations are persistent. Will reschedule AWV for later time.   Strict ED precautions were given including, but not limited to, presenting if noting significant HA, changes in vision, abd pain, CP, SOB.   - EKG 12-lead complete w/read - Clinics  - XR Chest 2 Views; Future  - Comprehensive metabolic panel (BMP + Alb, Alk Phos, ALT, AST, Total. Bili, TP); Future  - CBC with Platelets and Reflex to Iron Studies; Future  - Adult Cardiology Eval  Referral; Future  - Comprehensive metabolic panel (BMP + Alb, Alk Phos, ALT, AST, Total. Bili, TP)  - CBC with Platelets and Reflex to Iron Studies  - TSH with free T4 reflex; Future  - TSH with free T4 reflex    History of methamphetamine abuse (H)    Nicotine/Tobacco Cessation  He reports that he has been smoking. He has never used smokeless tobacco.  Nicotine/Tobacco Cessation  Plan  Patient working to quit, but we will discuss this at future visit.       Counseling  Appropriate preventive services were addressed with this patient via screening, questionnaire, or discussion as appropriate for fall prevention, nutrition, physical activity, Tobacco-use cessation, social engagement, weight loss and cognition.  Checklist reviewing preventive services available has been given to the patient.  Reviewed patient's diet, addressing concerns and/or questions.   He is at risk for lack of exercise and has been provided with information to increase physical activity for the benefit of his well-being.   The patient was instructed to see the dentist every 6 months.   Discussed possible causes of fatigue.     Camden Padgett is a 53 year old, presenting for the following health issues:  Wellness Visit and Neck Problem (Pt has concerns of lump on left side of neck)      8/23/2024     3:23 PM   Additional Questions   Roomed by Roula   Accompanied by peri CAI     - Presented for annual wellness, however, discussed blood pressure and potential irregularity of rhythm  - Blood pressure was reported high by the nurse, no history of high blood pressure  - Felt anxious coming into the clinic after a long time  - Asymptomatic, no headache, change in vision, or shortness of breath  - Reported having a black floater in one eye for years  - Felt lackluster in energy, more tired but not every day  - No changes in diet reported  - Planning to get dental work done, extraction and potential dentures  - Nurse had concerns about potential irregularity in heart rhythm  - No pain or unusual feelings reported in the heart area  - No history of checking blood pressure regularly   - Reported having pediatric stress and being 53 years old   - Last medical check-up was two years ago   - Trying to quit smoking, had two smokes on the day of consultation, smoking history of four years   - Noticed a lump on the  "throat recently, no pain unless messed with   - No history of thyroid problems or cholesterol issues   - Felt more tired for the last couple of days   - No current medications    Past medical history  - No history of hypertension  - No history of thyroid problems  - No history of cholesterol problems  - History of methamphetamine use (smoking) - 7 years ago    Family history  No known family history of thyroid problems    Social history  - Current smoker, trying to quit, smoked 2 cigarettes today, smoking for 4 years  - History of methamphetamine use (smoking) - 7 years ago      Review of Systems  Negative unless otherwise specified per HPI.      Objective    BP (!) 144/88   Pulse 89   Temp 98.8  F (37.1  C) (Temporal)   Resp 16   Ht 1.78 m (5' 10.08\")   Wt 69.4 kg (153 lb)   SpO2 97%   BMI 21.90 kg/m    Body mass index is 21.9 kg/m .  Physical Exam   GENERAL: alert and no distress  EYES: Eyes grossly normal to inspection, PERRL and conjunctivae and sclerae normal  HENT: nose and mouth without ulcers or lesions  NECK: no adenopathy, no asymmetry, masses, or scars, no carotid bruits appreciated, no bruits of thyroid appreciated.  RESP: lungs clear to auscultation - no rales, rhonchi or wheezes, normal rate and effort  CV: regular rate and rhythm, normal S1 S2, no S3 or S4, no murmur, click or rub, no peripheral edema  ABDOMEN: soft, nontender, non-distended  MS: no gross musculoskeletal defects noted, no edema  SKIN: no suspicious lesions or rashes  NEURO: Mentation intact and speech normal  PSYCH: affect normal, anxious    Results for orders placed or performed in visit on 08/23/24 (from the past 24 hour(s))   TSH with free T4 reflex   Result Value Ref Range    TSH 3.52 0.30 - 4.20 uIU/mL   Comprehensive metabolic panel (BMP + Alb, Alk Phos, ALT, AST, Total. Bili, TP)   Result Value Ref Range    Sodium 138 135 - 145 mmol/L    Potassium 4.1 3.4 - 5.3 mmol/L    Carbon Dioxide (CO2) 28 22 - 29 mmol/L    Anion " Gap 15 7 - 15 mmol/L    Urea Nitrogen 18.4 6.0 - 20.0 mg/dL    Creatinine 0.95 0.67 - 1.17 mg/dL    GFR Estimate >90 >60 mL/min/1.73m2    Calcium 9.6 8.8 - 10.4 mg/dL    Chloride 95 (L) 98 - 107 mmol/L    Glucose 133 (H) 70 - 99 mg/dL    Alkaline Phosphatase 84 40 - 150 U/L    AST 51 (H) 0 - 45 U/L    ALT 52 0 - 70 U/L    Protein Total 7.8 6.4 - 8.3 g/dL    Albumin 4.7 3.5 - 5.2 g/dL    Bilirubin Total 0.5 <=1.2 mg/dL   CBC with Platelets and Reflex to Iron Studies    Narrative    The following orders were created for panel order CBC with Platelets and Reflex to Iron Studies.  Procedure                               Abnormality         Status                     ---------                               -----------         ------                     CBC with Platelets and R...[[id number]]  Abnormal            Final result               Extra Green Top (Lithium...[[id number]]                      Final result                 Please view results for these tests on the individual orders.   CBC with Platelets and Reflex to Iron Studies   Result Value Ref Range    WBC Count 8.8 4.0 - 11.0 10e3/uL    RBC Count 4.76 4.40 - 5.90 10e6/uL    Hemoglobin 16.7 13.3 - 17.7 g/dL    Hematocrit 46.6 40.0 - 53.0 %    MCV 98 78 - 100 fL    MCH 35.1 (H) 26.5 - 33.0 pg    MCHC 35.8 31.5 - 36.5 g/dL    RDW 12.8 10.0 - 15.0 %    Platelet Count 272 150 - 450 10e3/uL   Extra Green Top (Lithium Heparin) Tube   Result Value Ref Range    Hold Specimen JIC          Signed Electronically by: Ester Choudhury DO

## 2024-08-27 ENCOUNTER — TELEPHONE (OUTPATIENT)
Dept: FAMILY MEDICINE | Facility: CLINIC | Age: 54
End: 2024-08-27
Payer: MEDICARE

## 2024-08-27 NOTE — TELEPHONE ENCOUNTER
Patient Returning Call    Reason for call:  Patient looking for test results need to talk to someone on the care team    Information relayed to patient:      Patient has additional questions:  Yes    What are your questions/concerns:  above    Who does the patient want to speak with:      Is an  needed?:  No      Could we send this information to you in [facility] or would you prefer to receive a phone call?:   Patient would prefer a phone call   Okay to leave a detailed message?: Yes at Cell number on file:    Telephone Information:   Mobile [phone]

## 2024-08-28 NOTE — TELEPHONE ENCOUNTER
MA called patient and relayed both lab result note for labs and xray. He is wondering if he still needs to follow up with Cardiology? Upon chart view, he is scheduled to see Cardiology in November 2024. Pt aware that provider is out of the office this week and we will contact him next week.       Mirella Ingram MA

## 2024-09-03 NOTE — TELEPHONE ENCOUNTER
Since we have him scheduled, I would still recommend have the patient see Cardiology since the EKG suggested some possible heart enlargement on the left side. I doubt he would need to see Cardiology long term, but we can get Dr. Higgins suggestions on any management or further evaluation, if any, and then can move forward and keep an eye on things from there. This is a great opportunity since he hasn't seen doctors much over recent years to get him all on track moving forward.    Ester Choudhury, DO

## 2024-09-23 ENCOUNTER — OFFICE VISIT (OUTPATIENT)
Dept: FAMILY MEDICINE | Facility: CLINIC | Age: 54
End: 2024-09-23
Payer: MEDICARE

## 2024-09-23 VITALS
HEART RATE: 83 BPM | TEMPERATURE: 99 F | OXYGEN SATURATION: 99 % | SYSTOLIC BLOOD PRESSURE: 176 MMHG | RESPIRATION RATE: 16 BRPM | BODY MASS INDEX: 22.82 KG/M2 | DIASTOLIC BLOOD PRESSURE: 121 MMHG | WEIGHT: 159.4 LBS

## 2024-09-23 DIAGNOSIS — I10 ESSENTIAL HYPERTENSION: Primary | ICD-10-CM

## 2024-09-23 DIAGNOSIS — Z12.11 SCREEN FOR COLON CANCER: ICD-10-CM

## 2024-09-23 PROCEDURE — 99214 OFFICE O/P EST MOD 30 MIN: CPT | Performed by: STUDENT IN AN ORGANIZED HEALTH CARE EDUCATION/TRAINING PROGRAM

## 2024-09-23 ASSESSMENT — PAIN SCALES - GENERAL: PAINLEVEL: NO PAIN (0)

## 2024-09-23 NOTE — PROGRESS NOTES
Assessment & Plan     Essential hypertension  Elevated BP here again today, so meets criteria for htn by definition. Recheck with BP still elevated despite improvement last visit to almost normal range. Patient still asymptomatic. Discussed getting a BP cuff and recommended monitoring and keeping a log to review with Cards when he sees them in November. Reviewed symptoms to monitor for and return precautions. However, given patient has remained asymptomatic, anticipate will be stable until appt in with Cardiology. Can rtc prn in meantime and discussed ER precautions if needed. Rx for BP cuff provided. Opted against initiation of medication today to avoid potential hypotension if truly normal range at home. If symptomatic can consider starting something later.   - Home Blood Pressure Monitor Order for DME - ONLY FOR DME    Screen for colon cancer  Ordered per request.  - Colonoscopy Screening  Referral; Future    30 minutes spent by me on the date of the encounter doing chart review, history and exam, documentation and further activities per the note    Subjective   Dayron is a 53 year old, presenting for the following health issues:  Blood Pressure Check      9/23/2024     2:57 PM   Additional Questions   Roomed by Iris     History of Present Illness       Hypertension: He presents for follow up of hypertension.  He does not check blood pressure  regularly outside of the clinic. Outside blood pressures have been over 140/90. He follows a low salt diet.     He eats 0-1 servings of fruits and vegetables daily.He consumes 0 sweetened beverage(s) daily.He exercises with enough effort to increase his heart rate 20 to 29 minutes per day.  He exercises with enough effort to increase his heart rate 7 days per week.   He is taking medications regularly.     Initial visit 8/23 with new finding of elevated BP in htn urgency range.   Full workup including CXR and EKG overall reassuring and unremarkable for  significant concerns.  Did refer to cards given the extent of his elevations, but they came down to nearly normal systolic range and normal diastolic after rest.  Suspecting somewhat related to anxiety.  Patient states he has had issues with elevated pressures at doctor's offices for years, never continued at home.   No symptoms, denies HA, changes in vision, CP, SOB.   Possible irregularity noted at that time, mention of this by rooming staff again today.   Not checking BP at home, no cuff.   Discussed possible initiation of BP medication at low dose to cover while awaiting cards appt, but would be concerned that normal range pressures outside of clinic would lead to hypotension on an agent.   Would be helpful if patient could more regularly check BP's at home and keep a log.     Patient also asks regarding ordering colon cancer screening today as he has never had one.  Would typically do this in association with annual, but as patient plans to return in January for annual after he sees Cards, will order this today to get going.     Review of Systems  Negative unless otherwise specified per HPI.        Objective    BP (!) 170/82   Pulse 83   Temp 99  F (37.2  C) (Temporal)   Resp 16   Wt 72.3 kg (159 lb 6.4 oz)   SpO2 99%   BMI 22.82 kg/m    Body mass index is 22.82 kg/m .  Physical Exam   GENERAL: alert and no acute distress  EYES: Eyes grossly normal to inspection, PERRL and conjunctivae and sclerae normal  HENT: nose and mouth without ulcers or lesions  RESP: normal rate and effort  CV: regular rate no murmur, click or rub, no peripheral edema  NEURO: Normal strength and tone, mentation intact and speech normal  PSYCH: mentation appears normal, affect normal/bright        Signed Electronically by: Ester Choudhury DO

## 2024-09-25 ENCOUNTER — HOSPITAL ENCOUNTER (OUTPATIENT)
Facility: CLINIC | Age: 54
End: 2024-09-25
Attending: SURGERY | Admitting: SURGERY
Payer: MEDICARE

## 2024-09-25 ENCOUNTER — TELEPHONE (OUTPATIENT)
Dept: GASTROENTEROLOGY | Facility: CLINIC | Age: 54
End: 2024-09-25
Payer: MEDICARE

## 2024-09-25 NOTE — TELEPHONE ENCOUNTER
"Endoscopy Scheduling Screen    Have you had any respiratory illness or flu-like symptoms in the last 10 days?  No    What is your communication preference for Instructions and/or Bowel Prep?   Mail/USPS    What insurance is in the chart?  Other:  MEDICARE    Ordering/Referring Provider: SHE HERNANDEZ   (If ordering provider performs procedure, schedule with ordering provider unless otherwise instructed. )    BMI: Estimated body mass index is 22.82 kg/m  as calculated from the following:    Height as of 8/23/24: 1.78 m (5' 10.08\").    Weight as of 9/23/24: 72.3 kg (159 lb 6.4 oz).     Sedation Ordered  moderate sedation.   If patient BMI > 50 do not schedule in [facility].    If patient BMI > 45 do not schedule at [facility].    Are you taking methadone or Suboxone?  NO, No RN review required.    Have you been diagnosed and are being treated for severe PTSD or severe anxiety?  NO, No RN review required.    Are you taking any prescription medications for pain 3 or more times per week?   NO, No RN review required.    Do you have a history of malignant hyperthermia?  No    (Females) Are you currently pregnant?   No     Have you been diagnosed or told you have pulmonary hypertension?   No    Do you have an LVAD?  No    Have you been told you have moderate to severe sleep apnea?  No.    Have you been told you have COPD, asthma, or any other lung disease?  No    Do you have any heart conditions?  No     Have you ever had or are you waiting for an organ transplant?  No. Continue scheduling, no site restrictions.    Have you had a stroke or transient ischemic attack (TIA aka \"mini stroke\" in the last 6 months?   No    Have you been diagnosed with or been told you have cirrhosis of the liver?   No.    Are you currently on dialysis?   No    Do you need assistance transferring?   No    BMI: Estimated body mass index is 22.82 kg/m  as calculated from the following:    Height as of 8/23/24: 1.78 m (5' 10.08\").    Weight as of 9/23/24: 72.3 kg " (159 lb 6.4 oz).     Is patients BMI > 40 and scheduling location [facility]?  No    Do you take an injectable or oral medication for weight loss or diabetes (excluding insulin)?  No    Do you take the medication Naltrexone?  No    Do you take blood thinners?  No       Prep   Are you currently on dialysis or do you have chronic kidney disease?  No    Do you have a diagnosis of diabetes?  No    Do you have a diagnosis of cystic fibrosis (CF)?  No    On a regular basis do you go 3 -5 days between bowel movements?  No    BMI > 40?  No    Preferred Pharmacy:    [facility] [address] [place] [address] - [address]  [address]  [facility] [address]  Phone: [phone] Fax: [phone]      Final Scheduling Details     Procedure scheduled  Colonoscopy    Surgeon:  BARBRA     Date of procedure:  10/31/24     Pre-OP / PAC:   No - Not required for this site.    Location  [facility] - Per order.    Sedation   MAC/Deep Sedation  Per location.      Patient Reminders:   You will receive a call from a Nurse to review instructions and health history.  This assessment must be completed prior to your procedure.  Failure to complete the Nurse assessment may result in the procedure being cancelled.      On the day of your procedure, please designate an adult(s) who can drive you home stay with you for the next 24 hours. The medicines used in the exam will make you sleepy. You will not be able to drive.      You cannot take public transportation, ride share services, or non-medical taxi service without a responsible caregiver.  Medical transport services are allowed with the requirement that a responsible caregiver will receive you at your destination.  We require that drivers and caregivers are confirmed prior to your procedure.

## 2024-10-04 ENCOUNTER — PATIENT OUTREACH (OUTPATIENT)
Dept: CARE COORDINATION | Facility: CLINIC | Age: 54
End: 2024-10-04
Payer: MEDICARE

## 2024-10-28 ENCOUNTER — TELEPHONE (OUTPATIENT)
Dept: GASTROENTEROLOGY | Facility: CLINIC | Age: 54
End: 2024-10-28
Payer: MEDICARE

## 2024-10-28 NOTE — TELEPHONE ENCOUNTER
Caller: No call made    Reason for Reschedule/Cancellation   (please be detailed, any staff messages or encounters to note?): pt informed [facility] RN that he would like to cancel      Prior to reschedule please review:  Ordering Provider: Ester Choudhury, DO  Sedation Determined: MAC  Does patient have any ASC Exclusions, please identify?: No      Notes on Cancelled Procedure:  Procedure: Lower Endoscopy [Colonoscopy]   Date: 10/31/2024  Location: [facility]; [address] [place] [address] , [place] [address]  Surgeon: JENSEN      Rescheduled: No,         Did you cancel or rescheduled an E[facility] procedure? No.

## 2024-11-12 ENCOUNTER — OFFICE VISIT (OUTPATIENT)
Dept: CARDIOLOGY | Facility: CLINIC | Age: 54
End: 2024-11-12
Attending: STUDENT IN AN ORGANIZED HEALTH CARE EDUCATION/TRAINING PROGRAM
Payer: MEDICARE

## 2024-11-12 VITALS
OXYGEN SATURATION: 98 % | HEART RATE: 77 BPM | SYSTOLIC BLOOD PRESSURE: 162 MMHG | RESPIRATION RATE: 16 BRPM | WEIGHT: 158 LBS | HEIGHT: 70 IN | BODY MASS INDEX: 22.62 KG/M2 | DIASTOLIC BLOOD PRESSURE: 96 MMHG

## 2024-11-12 DIAGNOSIS — R94.31 NONSPECIFIC ABNORMAL ELECTROCARDIOGRAM (ECG) (EKG): ICD-10-CM

## 2024-11-12 DIAGNOSIS — R03.0 ELEVATED BP WITHOUT DIAGNOSIS OF HYPERTENSION: ICD-10-CM

## 2024-11-12 DIAGNOSIS — I10 ESSENTIAL HYPERTENSION: Primary | ICD-10-CM

## 2024-11-12 DIAGNOSIS — I16.0 ASYMPTOMATIC HYPERTENSIVE URGENCY: ICD-10-CM

## 2024-11-12 PROCEDURE — 99204 OFFICE O/P NEW MOD 45 MIN: CPT | Performed by: INTERNAL MEDICINE

## 2024-11-12 PROCEDURE — 93000 ELECTROCARDIOGRAM COMPLETE: CPT | Performed by: INTERNAL MEDICINE

## 2024-11-12 RX ORDER — AMLODIPINE BESYLATE 5 MG/1
5 TABLET ORAL DAILY
Qty: 90 TABLET | Refills: 3 | Status: SHIPPED | OUTPATIENT
Start: 2024-11-12

## 2024-11-12 ASSESSMENT — PAIN SCALES - GENERAL: PAINLEVEL_OUTOF10: NO PAIN (0)

## 2024-11-12 NOTE — PROGRESS NOTES
HISTORY:    Dayron Rick is a pleasant 54-year-old gentleman without any significant medical problems and using no medications.  He was asked to see cardiology for hypertension.    Salbador is generally healthy and has never required medications of any type.  His blood pressures on recent readings have all been elevated ranging between 188-144 systolic with diastolic pressures between 86 and 121.  He does not check his blood pressure on his own at home.    Salbador denies cardiovascular symptoms.  He specifically denies exertional chest arm neck shoulder or jaw discomfort, PND/orthopnea, syncope or near syncope, peripheral edema, claudication, flushing, or strokelike symptoms.  He remains physically active and finds himself getting short of breath on activities such as playing soccer with his grandchildren recently.    Salbador reports that most of the time he cooks his own food and uses very little salt.  He uses very few refined foods.  He uses marijuana occasionally but no recreational drugs.  He uses aspirin on occasion for pain but never nonsteroidals.  He does use alcohol regularly.  He states that he buys a pack of 30 cans of beer per week drinks most of them himself but shares a few of those with friends.  He might drink up to 5 or 6 cans of beer on some days.  He also reports that he sleeps soundly and wakes up refreshed in the morning.    ECG is within normal limits and does not meet criteria for LVH, although I note that his last ECG his voltage was a little bit higher but still did not meet LVH criteria.      ASSESSMENT/PLAN:    1.  Essential hypertension.  I suggested that he cut back his alcohol consumption which may be contributing to his hypertension.  He seems amenable to this idea.  I do not find anything else that he can change in his lifestyle that is likely to lower his blood pressure.  He is physically active already and is relatively fit.  He needs antihypertensive medications and I have started him  on amlodipine 5 mg daily.  I asked him to obtain a blood pressure cuff and to start recording blood pressure readings on a regular basis and to bring the machine in with him on his next office visit.  I will have him follow-up with his primary caregiver for further medication adjustments.  He does not require further cardiology evaluation.  I would be happy to see him as needed in the future.    Thank you for inviting me to participate in the care of your patient.  Please don't hesitate to call if I can be of further assistance.  35 minutes were spent today reviewing the chart and other records, interviewing and examining the patient, and documenting our visit.    Chart documentation was completed, in part, with [employer] voice-recognition software. Even though reviewed, some grammatical, spelling, and word errors may remain.       Orders Placed This Encounter   Procedures    EKG 12-lead complete w/read - Clinics (performed today)     Orders Placed This Encounter   Medications    amLODIPine (NORVASC) 5 MG tablet     Sig: Take 1 tablet (5 mg) by mouth daily.     Dispense:  90 tablet     Refill:  3     There are no discontinued medications.    10 year ASCVD risk: The 10-year ASCVD risk score (Farhan DOZIER, et al., 2019) is: 6.1%    Values used to calculate the score:      Age: 54 years      Sex: Male      Is Non- : No      Diabetic: No      Tobacco smoker: No      Systolic Blood Pressure: 162 mmHg      Is BP treated: No      HDL Cholesterol: 57 mg/dL      Total Cholesterol: 184 mg/dL    Encounter Diagnoses   Name Primary?    Elevated BP without diagnosis of hypertension     Asymptomatic hypertensive urgency     Nonspecific abnormal electrocardiogram (ECG) (EKG)     Essential hypertension Yes       CURRENT MEDICATIONS:  Current Outpatient Medications   Medication Sig Dispense Refill    amLODIPine (NORVASC) 5 MG tablet Take 1 tablet (5 mg) by mouth daily. 90 tablet 3       ALLERGIES     Allergies    Allergen Reactions    Adhesive Tape      Steri strips caused blisters       PAST MEDICAL HISTORY:  Past Medical History:   Diagnosis Date    Substance abuse (H)        PAST SURGICAL HISTORY:  Past Surgical History:   Procedure Laterality Date    FUSION LUMBAR ANTERIOR, FUSION LUMBAR POSTERIOR THREE+ LEVELS, COMBINED  2013    TONSILLECTOMY         FAMILY HISTORY:  Family History   Problem Relation Age of Onset    Diabetes Mother          at 58, unsure of cause of death    Diabetes Type 2  Father     Heart Disease Father 78        quadruple bypass    Cardiovascular Father 79        stroke    Prostate Problems Father     No Known Problems Son     No Known Problems Son     Cancer Paternal Grandfather         stomach cancer    Chronic Obstructive Pulmonary Disease Brother     No Known Problems Sister        SOCIAL HISTORY:  Social History     Socioeconomic History    Marital status:      Spouse name: None    Number of children: 2    Years of education: 13    Highest education level: Some college, no degree   Occupational History    Occupation: Disabled   Tobacco Use    Smoking status: Former     Current packs/day: 0.50     Types: Cigarettes    Smokeless tobacco: Never   Vaping Use    Vaping status: Never Used   Substance and Sexual Activity    Alcohol use: Yes     Alcohol/week: 12.0 standard drinks of alcohol     Types: 12 Glasses of wine per week    Drug use: Not Currently     Types: Marijuana, Methamphetamines     Comment: HX    Sexual activity: Yes     Partners: Female     Birth control/protection: Post-menopausal     Comment: Partner = Post-menopausal     Social Drivers of Health     Financial Resource Strain: Low Risk  (2024)    Financial Resource Strain     Within the past 12 months, have you or your family members you live with been unable to get utilities (heat, electricity) when it was really needed?: No   Food Insecurity: Low Risk  (2024)    Food Insecurity     Within the past 12  months, did you worry that your food would run out before you got money to buy more?: No     Within the past 12 months, did the food you bought just not last and you didn t have money to get more?: No   Transportation Needs: Low Risk  (8/23/2024)    Transportation Needs     Within the past 12 months, has lack of transportation kept you from medical appointments, getting your medicines, non-medical meetings or appointments, work, or from getting things that you need?: No   Physical Activity: Unknown (8/23/2024)    Exercise Vital Sign     Days of Exercise per Week: 2 days   Stress: Stress Concern Present (8/23/2024)    [employer] - Occupational Stress Questionnaire     Feeling of Stress : To some extent   Social Connections: Unknown (8/23/2024)    Social Connection and Isolation Panel [NHANES]     Frequency of Social Gatherings with Friends and Family: More than three times a week   Interpersonal Safety: Low Risk  (9/23/2024)    Interpersonal Safety     Do you feel physically and emotionally safe where you currently live?: Yes     Within the past 12 months, have you been hit, slapped, kicked or otherwise physically hurt by someone?: No     Within the past 12 months, have you been humiliated or emotionally abused in other ways by your partner or ex-partner?: No   Housing Stability: Low Risk  (8/23/2024)    Housing Stability     Do you have housing? : Yes     Are you worried about losing your housing?: No       Review of Systems:  Skin:        Eyes:       ENT:       Respiratory:  Negative shortness of breath;cough;wheezing  Cardiovascular:  Negative;chest pain;edema;lightheadedness;dizziness;syncope or near-syncope Positive for;palpitations  Gastroenterology:      Genitourinary:       Musculoskeletal:       Neurologic:  Negative numbness or tingling of feet;numbness or tingling of hands  Psychiatric:       Heme/Lymph/Imm:  Positive for allergies  Endocrine:         Physical Exam:  Vitals:  "BP (!) 162/96 (BP Location: Left arm, Patient Position: Sitting, Cuff Size: Adult Regular)   Pulse 77   Resp 16   Ht 1.78 m (5' 10.08\")   Wt 71.7 kg (158 lb)   SpO2 98%   BMI 22.62 kg/m      Constitutional:  cooperative, alert and oriented, well developed, well nourished, in no acute distress        Skin:  warm and dry to the touch, no apparent skin lesions or masses noted        Head:  normocephalic, no masses or lesions        Eyes:  pupils equal and round, conjunctivae and lids unremarkable, sclera white, no xanthalasma, EOMS intact, no nystagmus        ENT:  no pallor or cyanosis, dentition good        Neck:  carotid pulses are full and equal bilaterally, JVP normal, no carotid bruit        Chest:  normal breath sounds, clear to auscultation, normal A-P diameter, normal symmetry, normal respiratory excursion, no use of accessory muscles        Cardiac: regular rhythm, normal S1/S2, no S3 or S4, apical impulse not displaced, no murmurs, gallops or rubs                  Abdomen:  abdomen soft;BS normoactive        Vascular:                                        Extremities and Muscular Skeletal:  no edema           Neurological:  no gross motor deficits        Psych:  affect appropriate, oriented to time, person and place     Recent Lab Results:  LIPID RESULTS:  Lab Results   Component Value Date    CHOL 184 12/15/2021    CHOL 169 04/29/2019    HDL 57 12/15/2021    HDL 42 04/29/2019     12/15/2021    LDL 93 04/29/2019    TRIG 71 12/15/2021    TRIG 170 (H) 04/29/2019       LIVER ENZYME RESULTS:  Lab Results   Component Value Date    AST 51 (H) 08/23/2024    AST 6 10/09/2019    ALT 52 08/23/2024    ALT 16 10/09/2019       CBC RESULTS:  Lab Results   Component Value Date    WBC 8.8 08/23/2024    WBC 11.4 (H) 10/09/2019    RBC 4.76 08/23/2024    RBC 4.83 10/09/2019    HGB 16.7 08/23/2024    HGB 15.5 10/09/2019    HCT 46.6 08/23/2024    HCT 45.9 10/09/2019    MCV 98 08/23/2024    MCV 95 10/09/2019    MCH " "35.1 (H) 08/23/2024    MCH 32.1 10/09/2019    MCHC 35.8 08/23/2024    MCHC 33.8 10/09/2019    RDW 12.8 08/23/2024    RDW 13.2 10/09/2019     08/23/2024     10/09/2019       BMP RESULTS:  Lab Results   Component Value Date     08/23/2024     10/09/2019    POTASSIUM 4.1 08/23/2024    POTASSIUM 4.6 12/26/2021    POTASSIUM 3.9 10/09/2019    CHLORIDE 95 (L) 08/23/2024    CHLORIDE 109 (H) 12/26/2021    CHLORIDE 108 10/09/2019    CO2 28 08/23/2024    CO2 21 (L) 12/26/2021    CO2 29 10/09/2019    ANIONGAP 15 08/23/2024    ANIONGAP 12 12/26/2021    ANIONGAP 6 10/09/2019     (H) 08/23/2024     12/26/2021    GLC 98 10/09/2019    BUN 18.4 08/23/2024    BUN 14 12/26/2021    BUN 13 10/09/2019    CR 0.95 08/23/2024    CR 0.84 10/09/2019    GFRESTIMATED >90 08/23/2024    GFRESTIMATED >90 10/09/2019    GFRESTBLACK >90 10/09/2019    MUSHTAQ 9.6 08/23/2024    MUSHTAQ 8.7 10/09/2019        A1C RESULTS:  No results found for: \"A1C\"    INR RESULTS:  No results found for: \"INR\"      Tao Higgins MD, FACC    CC  Ester Choudhury, DO  [address] [place] [address]                  "

## 2024-11-12 NOTE — LETTER
11/12/2024    Ester Choudhury, DO  [address] [place] [address]    RE: Dayron Rick       Dear Colleague,     I had the pleasure of seeing Dayron Rikc in the [facility].  HISTORY:    Dayron Rick is a pleasant 54-year-old gentleman without any significant medical problems and using no medications.  He was asked to see cardiology for hypertension.    Salbador is generally healthy and has never required medications of any type.  His blood pressures on recent readings have all been elevated ranging between 188-144 systolic with diastolic pressures between 86 and 121.  He does not check his blood pressure on his own at home.    Salbador denies cardiovascular symptoms.  He specifically denies exertional chest arm neck shoulder or jaw discomfort, PND/orthopnea, syncope or near syncope, peripheral edema, claudication, flushing, or strokelike symptoms.  He remains physically active and finds himself getting short of breath on activities such as playing soccer with his grandchildren recently.    Salbador reports that most of the time he cooks his own food and uses very little salt.  He uses very few refined foods.  He uses marijuana occasionally but no recreational drugs.  He uses aspirin on occasion for pain but never nonsteroidals.  He does use alcohol regularly.  He states that he buys a pack of 30 cans of beer per week drinks most of them himself but shares a few of those with friends.  He might drink up to 5 or 6 cans of beer on some days.  He also reports that he sleeps soundly and wakes up refreshed in the morning.    ECG is within normal limits and does not meet criteria for LVH, although I note that his last ECG his voltage was a little bit higher but still did not meet LVH criteria.      ASSESSMENT/PLAN:    1.  Essential hypertension.  I suggested that he cut back his alcohol consumption which may be contributing to his hypertension.  He seems amenable to this idea.  I do not find anything  else that he can change in his lifestyle that is likely to lower his blood pressure.  He is physically active already and is relatively fit.  He needs antihypertensive medications and I have started him on amlodipine 5 mg daily.  I asked him to obtain a blood pressure cuff and to start recording blood pressure readings on a regular basis and to bring the machine in with him on his next office visit.  I will have him follow-up with his primary caregiver for further medication adjustments.  He does not require further cardiology evaluation.  I would be happy to see him as needed in the future.    Thank you for inviting me to participate in the care of your patient.  Please don't hesitate to call if I can be of further assistance.  35 minutes were spent today reviewing the chart and other records, interviewing and examining the patient, and documenting our visit.    Chart documentation was completed, in part, with [employer] voice-recognition software. Even though reviewed, some grammatical, spelling, and word errors may remain.       Orders Placed This Encounter   Procedures     EKG 12-lead complete w/read - Clinics (performed today)     Orders Placed This Encounter   Medications     amLODIPine (NORVASC) 5 MG tablet     Sig: Take 1 tablet (5 mg) by mouth daily.     Dispense:  90 tablet     Refill:  3     There are no discontinued medications.    10 year ASCVD risk: The 10-year ASCVD risk score (Farhan DK, et al., 2019) is: 6.1%    Values used to calculate the score:      Age: 54 years      Sex: Male      Is Non- : No      Diabetic: No      Tobacco smoker: No      Systolic Blood Pressure: 162 mmHg      Is BP treated: No      HDL Cholesterol: 57 mg/dL      Total Cholesterol: 184 mg/dL    Encounter Diagnoses   Name Primary?     Elevated BP without diagnosis of hypertension      Asymptomatic hypertensive urgency      Nonspecific abnormal electrocardiogram (ECG) (EKG)      Essential hypertension Yes        CURRENT MEDICATIONS:  Current Outpatient Medications   Medication Sig Dispense Refill     amLODIPine (NORVASC) 5 MG tablet Take 1 tablet (5 mg) by mouth daily. 90 tablet 3       ALLERGIES     Allergies   Allergen Reactions     Adhesive Tape      Steri strips caused blisters       PAST MEDICAL HISTORY:  Past Medical History:   Diagnosis Date     Substance abuse (H)        PAST SURGICAL HISTORY:  Past Surgical History:   Procedure Laterality Date     FUSION LUMBAR ANTERIOR, FUSION LUMBAR POSTERIOR THREE+ LEVELS, COMBINED  2013     TONSILLECTOMY         FAMILY HISTORY:  Family History   Problem Relation Age of Onset     Diabetes Mother          at 58, unsure of cause of death     Diabetes Type 2  Father      Heart Disease Father 78        quadruple bypass     Cardiovascular Father 79        stroke     Prostate Problems Father      No Known Problems Son      No Known Problems Son      Cancer Paternal Grandfather         stomach cancer     Chronic Obstructive Pulmonary Disease Brother      No Known Problems Sister        SOCIAL HISTORY:  Social History     Socioeconomic History     Marital status:      Spouse name: None     Number of children: 2     Years of education: 13     Highest education level: Some college, no degree   Occupational History     Occupation: Disabled   Tobacco Use     Smoking status: Former     Current packs/day: 0.50     Types: Cigarettes     Smokeless tobacco: Never   Vaping Use     Vaping status: Never Used   Substance and Sexual Activity     Alcohol use: Yes     Alcohol/week: 12.0 standard drinks of alcohol     Types: 12 Glasses of wine per week     Drug use: Not Currently     Types: Marijuana, Methamphetamines     Comment: HX     Sexual activity: Yes     Partners: Female     Birth control/protection: Post-menopausal     Comment: Partner = Post-menopausal     Social Drivers of Health     Financial Resource Strain: Low Risk  (2024)    Financial Resource Strain       Within the past 12 months, have you or your family members you live with been unable to get utilities (heat, electricity) when it was really needed?: No   Food Insecurity: Low Risk  (8/23/2024)    Food Insecurity      Within the past 12 months, did you worry that your food would run out before you got money to buy more?: No      Within the past 12 months, did the food you bought just not last and you didn t have money to get more?: No   Transportation Needs: Low Risk  (8/23/2024)    Transportation Needs      Within the past 12 months, has lack of transportation kept you from medical appointments, getting your medicines, non-medical meetings or appointments, work, or from getting things that you need?: No   Physical Activity: Unknown (8/23/2024)    Exercise Vital Sign      Days of Exercise per Week: 2 days   Stress: Stress Concern Present (8/23/2024)    [employer] [place] [employer] - Occupational Stress Questionnaire      Feeling of Stress : To some extent   Social Connections: Unknown (8/23/2024)    Social Connection and Isolation Panel [NHANES]      Frequency of Social Gatherings with Friends and Family: More than three times a week   Interpersonal Safety: Low Risk  (9/23/2024)    Interpersonal Safety      Do you feel physically and emotionally safe where you currently live?: Yes      Within the past 12 months, have you been hit, slapped, kicked or otherwise physically hurt by someone?: No      Within the past 12 months, have you been humiliated or emotionally abused in other ways by your partner or ex-partner?: No   Housing Stability: Low Risk  (8/23/2024)    Housing Stability      Do you have housing? : Yes      Are you worried about losing your housing?: No       Review of Systems:  Skin:        Eyes:       ENT:       Respiratory:  Negative shortness of breath;cough;wheezing  Cardiovascular:  Negative;chest pain;edema;lightheadedness;dizziness;syncope or near-syncope Positive  "for;palpitations  Gastroenterology:      Genitourinary:       Musculoskeletal:       Neurologic:  Negative numbness or tingling of feet;numbness or tingling of hands  Psychiatric:       Heme/Lymph/Imm:  Positive for allergies  Endocrine:         Physical Exam:  Vitals: BP (!) 162/96 (BP Location: Left arm, Patient Position: Sitting, Cuff Size: Adult Regular)   Pulse 77   Resp 16   Ht 1.78 m (5' 10.08\")   Wt 71.7 kg (158 lb)   SpO2 98%   BMI 22.62 kg/m      Constitutional:  cooperative, alert and oriented, well developed, well nourished, in no acute distress        Skin:  warm and dry to the touch, no apparent skin lesions or masses noted        Head:  normocephalic, no masses or lesions        Eyes:  pupils equal and round, conjunctivae and lids unremarkable, sclera white, no xanthalasma, EOMS intact, no nystagmus        ENT:  no pallor or cyanosis, dentition good        Neck:  carotid pulses are full and equal bilaterally, JVP normal, no carotid bruit        Chest:  normal breath sounds, clear to auscultation, normal A-P diameter, normal symmetry, normal respiratory excursion, no use of accessory muscles        Cardiac: regular rhythm, normal S1/S2, no S3 or S4, apical impulse not displaced, no murmurs, gallops or rubs                  Abdomen:  abdomen soft;BS normoactive        Vascular:                                        Extremities and Muscular Skeletal:  no edema           Neurological:  no gross motor deficits        Psych:  affect appropriate, oriented to time, person and place     Recent Lab Results:  LIPID RESULTS:  Lab Results   Component Value Date    CHOL 184 12/15/2021    CHOL 169 04/29/2019    HDL 57 12/15/2021    HDL 42 04/29/2019     12/15/2021    LDL 93 04/29/2019    TRIG 71 12/15/2021    TRIG 170 (H) 04/29/2019       LIVER ENZYME RESULTS:  Lab Results   Component Value Date    AST 51 (H) 08/23/2024    AST 6 10/09/2019    ALT 52 08/23/2024    ALT 16 10/09/2019       CBC " "RESULTS:  Lab Results   Component Value Date    WBC 8.8 08/23/2024    WBC 11.4 (H) 10/09/2019    RBC 4.76 08/23/2024    RBC 4.83 10/09/2019    HGB 16.7 08/23/2024    HGB 15.5 10/09/2019    HCT 46.6 08/23/2024    HCT 45.9 10/09/2019    MCV 98 08/23/2024    MCV 95 10/09/2019    MCH 35.1 (H) 08/23/2024    MCH 32.1 10/09/2019    MCHC 35.8 08/23/2024    MCHC 33.8 10/09/2019    RDW 12.8 08/23/2024    RDW 13.2 10/09/2019     08/23/2024     10/09/2019       BMP RESULTS:  Lab Results   Component Value Date     08/23/2024     10/09/2019    POTASSIUM 4.1 08/23/2024    POTASSIUM 4.6 12/26/2021    POTASSIUM 3.9 10/09/2019    CHLORIDE 95 (L) 08/23/2024    CHLORIDE 109 (H) 12/26/2021    CHLORIDE 108 10/09/2019    CO2 28 08/23/2024    CO2 21 (L) 12/26/2021    CO2 29 10/09/2019    ANIONGAP 15 08/23/2024    ANIONGAP 12 12/26/2021    ANIONGAP 6 10/09/2019     (H) 08/23/2024     12/26/2021    GLC 98 10/09/2019    BUN 18.4 08/23/2024    BUN 14 12/26/2021    BUN 13 10/09/2019    CR 0.95 08/23/2024    CR 0.84 10/09/2019    GFRESTIMATED >90 08/23/2024    GFRESTIMATED >90 10/09/2019    GFRESTBLACK >90 10/09/2019    MUSHTAQ 9.6 08/23/2024    MUSHTAQ 8.7 10/09/2019        A1C RESULTS:  No results found for: \"A1C\"    INR RESULTS:  No results found for: \"INR\"      Tao Higgins MD, FACC    CC  Ester Choudhury,   [address] [place] [address]                    Thank you for allowing me to participate in the care of your patient.      Sincerely,     Tao Higgins MD     [facility]  cc:   Ester Choudhury,   [address] [place] [address]      "

## 2025-01-03 PROBLEM — M67.431 GANGLION CYST OF WRIST, RIGHT: Status: ACTIVE | Noted: 2025-01-03

## 2025-02-11 ENCOUNTER — TELEPHONE (OUTPATIENT)
Dept: FAMILY MEDICINE | Facility: CLINIC | Age: 55
End: 2025-02-11
Payer: MEDICARE

## 2025-02-11 NOTE — TELEPHONE ENCOUNTER
Patient Quality Outreach    Patient is due for the following:   Hypertension -  Hypertension follow-up visit  Colon Cancer Screening      Topic Date Due    Hepatitis B Vaccine (1 of 3 - 19+ 3-dose series) Never done    Pneumococcal Vaccine (1 of 1 - PCV) Never done    Zoster (Shingles) Vaccine (1 of 2) Never done    Flu Vaccine (1) 09/01/2024    COVID-19 Vaccine (4 - 2024-25 season) 09/01/2024       Action(s) Taken:   Patient has upcoming appointment, these items will be addressed at that time.    Type of outreach:    Chart review performed, no outreach needed.    Questions for provider review:    None           Mirella Ingram MA

## 2025-04-07 ENCOUNTER — PATIENT OUTREACH (OUTPATIENT)
Dept: CARE COORDINATION | Facility: CLINIC | Age: 55
End: 2025-04-07
Payer: MEDICARE

## 2025-07-10 ENCOUNTER — PATIENT OUTREACH (OUTPATIENT)
Dept: CARE COORDINATION | Facility: CLINIC | Age: 55
End: 2025-07-10
Payer: MEDICARE